# Patient Record
Sex: MALE | Race: WHITE | NOT HISPANIC OR LATINO | URBAN - METROPOLITAN AREA
[De-identification: names, ages, dates, MRNs, and addresses within clinical notes are randomized per-mention and may not be internally consistent; named-entity substitution may affect disease eponyms.]

---

## 2021-12-12 NOTE — PROGRESS NOTES
SUBJECTIVE:   CC: Henry Aguilar is an 21 year old male who presents for preventative health visit.     {  Patient has been advised of split billing requirements and indicates understanding: Yes  Healthy Habits:     Getting at least 3 servings of Calcium per day:  NO    Bi-annual eye exam:  Yes    Dental care twice a year:  Yes    Sleep apnea or symptoms of sleep apnea:  None    Diet:  Regular (no restrictions)    Frequency of exercise:  4-5 days/week    Duration of exercise:  Greater than 60 minutes    Taking medications regularly:  Yes    Medication side effects:  None    PHQ-2 Total Score: 0    Additional concerns today:  Yes      Today's PHQ-2 Score:   PHQ-2 ( 1999 Pfizer) 12/15/2021   Q1: Little interest or pleasure in doing things 0   Q2: Feeling down, depressed or hopeless 0   PHQ-2 Score 0   Q1: Little interest or pleasure in doing things Not at all   Q2: Feeling down, depressed or hopeless Not at all   PHQ-2 Score 0       Abuse: Current or Past(Physical, Sexual or Emotional)- No  Do you feel safe in your environment? Yes        Social History     Tobacco Use     Smoking status: Never Smoker     Smokeless tobacco: Never Used   Substance Use Topics     Alcohol use: Yes     Comment: occasional     If you drink alcohol do you typically have >3 drinks per day or >7 drinks per week? No    Alcohol Use 12/15/2021   Prescreen: >3 drinks/day or >7 drinks/week? No   Prescreen: >3 drinks/day or >7 drinks/week? -   No flowsheet data found.    Last PSA: No results found for: PSA    Reviewed orders with patient. Reviewed health maintenance and updated orders accordingly - Yes  Lab work is in process  Labs reviewed in EPIC  BP Readings from Last 3 Encounters:   12/15/21 133/78    Wt Readings from Last 3 Encounters:   12/15/21 57.7 kg (127 lb 1.6 oz)                    Reviewed and updated as needed this visit by clinical staff  Tobacco  Allergies  Meds  Problems  Med Hx  Surg Hx  Fam Hx  Soc Hx  "        Reviewed and updated as needed this visit by Provider    Meds  Problems  Med Hx  Surg Hx  Fam Hx        Here today to establish care.  As expected for his age the patient is generally in pretty decent health.  Saw dermatology recently for the assistance with some early hidradenitis.  Now on topical therapy.  Has experienced issues with anxiety since teenage years.  Over the past year things have flared up a bit more and he is experiencing some OCD type symptoms as well.  Is interested in meeting with a counselor to talk about how to help keep this in check.    Review of Systems   Gastrointestinal: Positive for heartburn.   Psychiatric/Behavioral: The patient is nervous/anxious.      CONSTITUTIONAL: NEGATIVE for fever, chills, change in weight  INTEGUMENTARY/SKIN: As above  EYES: NEGATIVE for vision changes or irritation  ENT: NEGATIVE for ear, mouth and throat problems  RESP: NEGATIVE for significant cough or SOB  CV: NEGATIVE for chest pain, palpitations or peripheral edema  GI: NEGATIVE for nausea, abdominal pain, heartburn, or change in bowel habits   male: negative for dysuria, hematuria, decreased urinary stream, erectile dysfunction, urethral discharge  MUSCULOSKELETAL: NEGATIVE for significant arthralgias or myalgia  NEURO: NEGATIVE for weakness, dizziness or paresthesias  PSYCHIATRIC: As above    OBJECTIVE:   /78 (BP Location: Right arm, Patient Position: Sitting, Cuff Size: Adult Regular)   Pulse 110   Temp 98.9  F (37.2  C) (Oral)   Resp 18   Ht 1.626 m (5' 4\")   Wt 57.7 kg (127 lb 1.6 oz)   SpO2 95%   BMI 21.82 kg/m      Physical Exam  GENERAL: healthy, alert and no distress  EYES: Eyes grossly normal to inspection, PERRL and conjunctivae and sclerae normal  HENT: ear canals and TM's normal, nose and mouth without ulcers or lesions  NECK: no adenopathy, no asymmetry, masses, or scars and thyroid normal to palpation  RESP: lungs clear to auscultation - no rales, rhonchi or " "wheezes  CV: regular rate and rhythm, normal S1 S2, no S3 or S4, no murmur, click or rub, no peripheral edema and peripheral pulses strong  ABDOMEN: soft, nontender, no hepatosplenomegaly, no masses and bowel sounds normal  MS: no gross musculoskeletal defects noted, no edema  SKIN: no suspicious lesions or rashes  NEURO: Normal strength and tone, mentation intact and speech normal  PSYCH: mentation appears normal, affect normal/bright    Diagnostic Test Results:  Labs reviewed in Epic    ASSESSMENT/PLAN:   (Z00.00) Routine general medical examination at a health care facility  (primary encounter diagnosis)  Comment: Routine health maintenance up-to-date otherwise.  Has had Covid booster and flu shot  Plan:     (F41.9) Anxiety  Comment: We will help get him set up with psychology/counseling for this  Plan: Adult Mental Health Referral            (L73.2) Hidradenitis  Comment: Working with dermatology we will follow along  Plan:     (Z13.220) Screening cholesterol level  Comment:   Plan: Lipid panel reflex to direct LDL Non-fasting            (Z13.1) Diabetes mellitus screening  Comment:   Plan: Glucose            (Z11.3) Screen for STD (sexually transmitted disease)  Comment:   Plan: HIV Antigen Antibody Combo, NEISSERIA         GONORRHOEA PCR, CHLAMYDIA TRACHOMATIS PCR              Patient has been advised of split billing requirements and indicates understanding: Yes  COUNSELING:   Reviewed preventive health counseling, as reflected in patient instructions       Regular exercise       Healthy diet/nutrition       Vision screening       Safe sex practices/STD prevention    Estimated body mass index is 21.82 kg/m  as calculated from the following:    Height as of this encounter: 1.626 m (5' 4\").    Weight as of this encounter: 57.7 kg (127 lb 1.6 oz).         He reports that he has never smoked. He has never used smokeless tobacco.      Counseling Resources:  ATP IV Guidelines  Pooled Cohorts Equation " Calculator  FRAX Risk Assessment  ICSI Preventive Guidelines  Dietary Guidelines for Americans, 2010  USDA's MyPlate  ASA Prophylaxis  Lung CA Screening    Susie Carvalho MD  [facility]

## 2021-12-15 ENCOUNTER — OFFICE VISIT (OUTPATIENT)
Dept: FAMILY MEDICINE | Facility: CLINIC | Age: 21
End: 2021-12-15
Payer: COMMERCIAL

## 2021-12-15 VITALS
DIASTOLIC BLOOD PRESSURE: 78 MMHG | TEMPERATURE: 98.9 F | OXYGEN SATURATION: 95 % | HEART RATE: 110 BPM | SYSTOLIC BLOOD PRESSURE: 133 MMHG | BODY MASS INDEX: 21.7 KG/M2 | RESPIRATION RATE: 18 BRPM | HEIGHT: 64 IN | WEIGHT: 127.1 LBS

## 2021-12-15 DIAGNOSIS — Z11.3 SCREEN FOR STD (SEXUALLY TRANSMITTED DISEASE): ICD-10-CM

## 2021-12-15 DIAGNOSIS — Z13.220 SCREENING CHOLESTEROL LEVEL: ICD-10-CM

## 2021-12-15 DIAGNOSIS — L73.2 HIDRADENITIS: ICD-10-CM

## 2021-12-15 DIAGNOSIS — F41.9 ANXIETY: ICD-10-CM

## 2021-12-15 DIAGNOSIS — Z13.1 DIABETES MELLITUS SCREENING: ICD-10-CM

## 2021-12-15 DIAGNOSIS — Z00.00 ROUTINE GENERAL MEDICAL EXAMINATION AT A HEALTH CARE FACILITY: Primary | ICD-10-CM

## 2021-12-15 PROCEDURE — 87591 N.GONORRHOEAE DNA AMP PROB: CPT | Performed by: FAMILY MEDICINE

## 2021-12-15 PROCEDURE — 87491 CHLMYD TRACH DNA AMP PROBE: CPT | Performed by: FAMILY MEDICINE

## 2021-12-15 PROCEDURE — 80061 LIPID PANEL: CPT | Performed by: FAMILY MEDICINE

## 2021-12-15 PROCEDURE — 36415 COLL VENOUS BLD VENIPUNCTURE: CPT | Performed by: FAMILY MEDICINE

## 2021-12-15 PROCEDURE — 87389 HIV-1 AG W/HIV-1&-2 AB AG IA: CPT | Performed by: FAMILY MEDICINE

## 2021-12-15 PROCEDURE — 82947 ASSAY GLUCOSE BLOOD QUANT: CPT | Performed by: FAMILY MEDICINE

## 2021-12-15 PROCEDURE — 99385 PREV VISIT NEW AGE 18-39: CPT | Performed by: FAMILY MEDICINE

## 2021-12-15 RX ORDER — CLINDAMYCIN PHOSPHATE 10 MG/G
GEL TOPICAL
COMMUNITY
Start: 2021-12-10 | End: 2023-01-31

## 2021-12-15 RX ORDER — TRETINOIN 0.25 MG/G
CREAM TOPICAL
COMMUNITY
Start: 2021-12-10 | End: 2023-01-31

## 2021-12-15 ASSESSMENT — ENCOUNTER SYMPTOMS
HEARTBURN: 1
NERVOUS/ANXIOUS: 1

## 2021-12-15 ASSESSMENT — PAIN SCALES - GENERAL: PAINLEVEL: MILD PAIN (3)

## 2021-12-15 ASSESSMENT — MIFFLIN-ST. JEOR: SCORE: 1492.52

## 2021-12-16 LAB
CHOLEST SERPL-MCNC: 171 MG/DL
FASTING STATUS PATIENT QL REPORTED: NO
FASTING STATUS PATIENT QL REPORTED: NO
GLUCOSE BLD-MCNC: 79 MG/DL (ref 70–99)
HDLC SERPL-MCNC: 70 MG/DL
LDLC SERPL CALC-MCNC: 78 MG/DL
NONHDLC SERPL-MCNC: 101 MG/DL
TRIGL SERPL-MCNC: 115 MG/DL

## 2021-12-17 LAB
C TRACH DNA SPEC QL NAA+PROBE: NEGATIVE
HIV 1+2 AB+HIV1 P24 AG SERPL QL IA: NONREACTIVE
N GONORRHOEA DNA SPEC QL NAA+PROBE: NEGATIVE

## 2021-12-17 NOTE — RESULT ENCOUNTER NOTE
Henry,  Everything looks just fine.  Keep up the good work.  Nice to meet you the other day.  JERICHO Carvalho M.D.

## 2022-10-03 ENCOUNTER — HEALTH MAINTENANCE LETTER (OUTPATIENT)
Age: 22
End: 2022-10-03

## 2023-01-31 ENCOUNTER — OFFICE VISIT (OUTPATIENT)
Dept: FAMILY MEDICINE | Facility: CLINIC | Age: 23
End: 2023-01-31
Payer: COMMERCIAL

## 2023-01-31 VITALS
SYSTOLIC BLOOD PRESSURE: 120 MMHG | RESPIRATION RATE: 20 BRPM | HEART RATE: 129 BPM | OXYGEN SATURATION: 100 % | WEIGHT: 127.2 LBS | BODY MASS INDEX: 21.72 KG/M2 | TEMPERATURE: 99 F | DIASTOLIC BLOOD PRESSURE: 76 MMHG | HEIGHT: 64 IN

## 2023-01-31 DIAGNOSIS — M25.532 PAIN IN BOTH WRISTS: ICD-10-CM

## 2023-01-31 DIAGNOSIS — M25.531 PAIN IN BOTH WRISTS: ICD-10-CM

## 2023-01-31 DIAGNOSIS — M65.30 TRIGGER FINGER, ACQUIRED: ICD-10-CM

## 2023-01-31 DIAGNOSIS — Z00.00 ROUTINE GENERAL MEDICAL EXAMINATION AT A HEALTH CARE FACILITY: Primary | ICD-10-CM

## 2023-01-31 PROCEDURE — 99395 PREV VISIT EST AGE 18-39: CPT | Performed by: FAMILY MEDICINE

## 2023-01-31 ASSESSMENT — ENCOUNTER SYMPTOMS
NERVOUS/ANXIOUS: 0
DIARRHEA: 0
NAUSEA: 0
JOINT SWELLING: 0
HEARTBURN: 0
FEVER: 0
SHORTNESS OF BREATH: 0
HEMATOCHEZIA: 0
EYE PAIN: 0
DYSURIA: 0
HEMATURIA: 0
ARTHRALGIAS: 0
CONSTIPATION: 0
ABDOMINAL PAIN: 0
HEADACHES: 0
PARESTHESIAS: 0
COUGH: 0
DIZZINESS: 0
FREQUENCY: 0
CHILLS: 0
PALPITATIONS: 0
MYALGIAS: 0
WEAKNESS: 0
SORE THROAT: 0

## 2023-01-31 NOTE — PROGRESS NOTES
SUBJECTIVE:   CC: Henry is an 22 year old who presents for preventative health visit.     Patient has been advised of split billing requirements and indicates understanding: Yes     Healthy Habits:     Getting at least 3 servings of Calcium per day:  Yes    Bi-annual eye exam:  Yes    Dental care twice a year:  Yes    Sleep apnea or symptoms of sleep apnea:  None    Diet:  Regular (no restrictions)    Frequency of exercise:  4-5 days/week    Duration of exercise:  45-60 minutes    Taking medications regularly:  Yes    Medication side effects:  Not applicable    PHQ-2 Total Score: 0    Additional concerns today:  Yes    Patient has concerns about his bilateral wrist pain. He states he's been experiencing it for a year but for the past 6 months, it seems to have increase in severity. Patient states the pain gets worse with lifting.       Today's PHQ-2 Score:   PHQ-2 ( 1999 Pfizer) 1/31/2023   Q1: Little interest or pleasure in doing things 0   Q2: Feeling down, depressed or hopeless 0   PHQ-2 Score 0   Q1: Little interest or pleasure in doing things Not at all   Q2: Feeling down, depressed or hopeless Not at all   PHQ-2 Score 0       Have you ever done Advance Care Planning? (For example, a Health Directive, POLST, or a discussion with a medical provider or your loved ones about your wishes):     Social History     Tobacco Use     Smoking status: Never     Smokeless tobacco: Never   Substance Use Topics     Alcohol use: Yes     Comment: occasional     If you drink alcohol do you typically have >3 drinks per day or >7 drinks per week? No    Alcohol Use 1/31/2023   Prescreen: >3 drinks/day or >7 drinks/week? No   Prescreen: >3 drinks/day or >7 drinks/week? -   No flowsheet data found.    Last PSA: No results found for: PSA    Reviewed orders with patient. Reviewed health maintenance and updated orders accordingly - Yes  Lab work is in process  Labs reviewed in EPIC  BP Readings from Last 3 Encounters:   01/31/23 120/76  "  12/15/21 133/78    Wt Readings from Last 3 Encounters:   01/31/23 57.7 kg (127 lb 3.2 oz)   12/15/21 57.7 kg (127 lb 1.6 oz)                    Reviewed and updated as needed this visit by clinical staff   Tobacco  Allergies  Meds  Problems  Med Hx  Surg Hx  Fam Hx          Reviewed and updated as needed this visit by Provider   Tobacco  Allergies  Meds  Problems  Med Hx  Surg Hx  Fam Hx         Here today for routine checkup.  For the most part doing very well overall.  Working as described at [employer].  Lifts weights at the gym routinely but for the past number of months has developed some bilateral wrist pain.  Also has his index fingers lock in a trigger fashion.  Occasionally using the squat bar across his neck his arms will go slightly numb.    Review of Systems   Constitutional: Negative for chills and fever.   HENT: Negative for congestion, ear pain, hearing loss and sore throat.    Eyes: Negative for pain and visual disturbance.   Respiratory: Negative for cough and shortness of breath.    Cardiovascular: Negative for chest pain, palpitations and peripheral edema.   Gastrointestinal: Negative for abdominal pain, constipation, diarrhea, heartburn, hematochezia and nausea.   Genitourinary: Negative for dysuria, frequency, genital sores, hematuria, impotence, penile discharge and urgency.   Musculoskeletal: Negative for arthralgias, joint swelling and myalgias.   Skin: Negative for rash.   Neurological: Negative for dizziness, weakness, headaches and paresthesias.   Psychiatric/Behavioral: Negative for mood changes. The patient is not nervous/anxious.           OBJECTIVE:   /76 (BP Location: Right arm, Patient Position: Chair, Cuff Size: Adult Regular)   Pulse (!) 129   Temp 99  F (37.2  C) (Oral)   Resp 20   Ht 1.626 m (5' 4\")   Wt 57.7 kg (127 lb 3.2 oz)   SpO2 100%   BMI 21.83 kg/m      Physical Exam  GENERAL: healthy, alert and no distress  EYES: Eyes grossly normal to " inspection, PERRL and conjunctivae and sclerae normal  HENT: ear canals and TM's normal, nose and mouth without ulcers or lesions  NECK: no adenopathy, no asymmetry, masses, or scars and thyroid normal to palpation  RESP: lungs clear to auscultation - no rales, rhonchi or wheezes  CV: regular rate and rhythm, normal S1 S2, no S3 or S4, no murmur, click or rub, no peripheral edema and peripheral pulses strong  ABDOMEN: soft, nontender, no hepatosplenomegaly, no masses and bowel sounds normal  MS: no gross musculoskeletal defects noted, no edema  SKIN: no suspicious lesions or rashes  NEURO: Normal strength and tone, mentation intact and speech normal  PSYCH: mentation appears normal, affect normal/bright    Diagnostic Test Results:  Labs reviewed in Epic    ASSESSMENT/PLAN:   (Z00.00) Routine general medical examination at a health care facility  (primary encounter diagnosis)  Comment: Routine health maintenance otherwise up-to-date.  We had a discussion about potential pre-HIV prophylaxis (Kiya Ramsay).  So we talked about how that worked and he will contact me if that is something he is interested in starting.  Would recommend HIV (and may be other STD) testing at baseline and 3 months.  Plan:     (M25.531,  M25.532) Pain in both wrists  Comment: I think he be a great candidate to see sports medicine as we want to keep him going is much as possible.  Plan: Orthopedic  Referral            (M65.30) Trigger finger, acquired  Comment:   Plan: Orthopedic  Referral              Patient has been advised of split billing requirements and indicates understanding: Yes      COUNSELING:   Reviewed preventive health counseling, as reflected in patient instructions       Regular exercise       Healthy diet/nutrition       Vision screening        He reports that he has never smoked. He has never used smokeless tobacco.            Susie Carvalho MD  [facility]

## 2023-02-03 NOTE — TELEPHONE ENCOUNTER
DIAGNOSIS: Pain in both wrists, trigger finger    APPOINTMENT DATE: 02/22/2023   NOTES STATUS DETAILS   OFFICE NOTE from referring provider Internal 01/31/2023 Dr Carvalho [facility]    OFFICE NOTE from other specialist N/A    DISCHARGE SUMMARY from hospital N/A    DISCHARGE REPORT from the ER N/A    OPERATIVE REPORT N/A    EMG report N/A    MEDICATION LIST N/A    MRI N/A    DEXA (osteoporosis/bone health) N/A    CT SCAN N/A    XRAYS (IMAGES & REPORTS) N/A

## 2023-02-13 ENCOUNTER — LAB (OUTPATIENT)
Dept: LAB | Facility: CLINIC | Age: 23
End: 2023-02-13
Payer: COMMERCIAL

## 2023-02-13 DIAGNOSIS — Z29.81 ENCOUNTER FOR COUNSELING BEFORE STARTING AND ABOUT PRE-EXPOSURE PROPHYLAXIS FOR HIV: ICD-10-CM

## 2023-02-13 DIAGNOSIS — Z71.89 ENCOUNTER FOR COUNSELING BEFORE STARTING AND ABOUT PRE-EXPOSURE PROPHYLAXIS FOR HIV: ICD-10-CM

## 2023-02-13 DIAGNOSIS — Z11.3 SCREEN FOR STD (SEXUALLY TRANSMITTED DISEASE): ICD-10-CM

## 2023-02-13 PROCEDURE — 86803 HEPATITIS C AB TEST: CPT

## 2023-02-13 PROCEDURE — 87389 HIV-1 AG W/HIV-1&-2 AB AG IA: CPT

## 2023-02-13 PROCEDURE — 36415 COLL VENOUS BLD VENIPUNCTURE: CPT

## 2023-02-14 LAB
HCV AB SERPL QL IA: NONREACTIVE
HIV 1+2 AB+HIV1 P24 AG SERPL QL IA: NONREACTIVE

## 2023-02-22 ENCOUNTER — ANCILLARY PROCEDURE (OUTPATIENT)
Dept: GENERAL RADIOLOGY | Facility: CLINIC | Age: 23
End: 2023-02-22
Attending: PREVENTIVE MEDICINE
Payer: COMMERCIAL

## 2023-02-22 ENCOUNTER — PRE VISIT (OUTPATIENT)
Dept: ORTHOPEDICS | Facility: CLINIC | Age: 23
End: 2023-02-22

## 2023-02-22 ENCOUNTER — OFFICE VISIT (OUTPATIENT)
Dept: ORTHOPEDICS | Facility: CLINIC | Age: 23
End: 2023-02-22
Attending: FAMILY MEDICINE
Payer: COMMERCIAL

## 2023-02-22 DIAGNOSIS — M54.2 NECK PAIN: Primary | ICD-10-CM

## 2023-02-22 DIAGNOSIS — M25.532 PAIN IN BOTH WRISTS: ICD-10-CM

## 2023-02-22 DIAGNOSIS — M54.12 CERVICAL RADICULAR PAIN: ICD-10-CM

## 2023-02-22 DIAGNOSIS — M65.30 TRIGGER FINGER, ACQUIRED: ICD-10-CM

## 2023-02-22 DIAGNOSIS — M25.531 PAIN IN BOTH WRISTS: ICD-10-CM

## 2023-02-22 DIAGNOSIS — M54.2 NECK PAIN: ICD-10-CM

## 2023-02-22 PROCEDURE — 99204 OFFICE O/P NEW MOD 45 MIN: CPT | Performed by: PREVENTIVE MEDICINE

## 2023-02-22 PROCEDURE — 73130 X-RAY EXAM OF HAND: CPT | Mod: LT | Performed by: RADIOLOGY

## 2023-02-22 PROCEDURE — 72040 X-RAY EXAM NECK SPINE 2-3 VW: CPT | Mod: GC | Performed by: RADIOLOGY

## 2023-02-22 RX ORDER — NAPROXEN 500 MG/1
500 TABLET ORAL 2 TIMES DAILY WITH MEALS
Qty: 40 TABLET | Refills: 0 | Status: SHIPPED | OUTPATIENT
Start: 2023-02-22 | End: 2023-09-05

## 2023-02-22 NOTE — PROGRESS NOTES
HISTORY OF PRESENT ILLNESS  Mr. Aguilar is a pleasant 22 year old year old male who presents to clinic today with   Henry explains that he is here today to discuss bilateral trigger finger, as well as bilateral tingling in arms. These symptoms have been present roughly 6 months     Location: bilateral wrist pain, bilateral trigger finger     Quality:  achy pain    Severity: 5/10 at worst    Duration: see above  Timing: occurs intermittently  Context: occurs while exercising and lifting and using the joint  Modifying factors:  resting and non-use makes it better, movement and use makes it worse  Associated signs & symptoms: see above  MEDICAL HISTORY  Patient Active Problem List   Diagnosis     Hidradenitis     Anxiety       Current Outpatient Medications   Medication Sig Dispense Refill     emtricitabine-tenofovir (TRUVADA) 200-300 MG per tablet 2 tabs prior to sexual activity, then 1 tab daily x 2 days. 20 tablet 2       Allergies   Allergen Reactions     Sulfa Drugs        Family History   Problem Relation Age of Onset     Hypertension Mother      Social History     Socioeconomic History     Marital status: Single   Tobacco Use     Smoking status: Never     Smokeless tobacco: Never   Substance and Sexual Activity     Alcohol use: Yes     Comment: occasional     Drug use: Never     Sexual activity: Yes     Partners: Male     Birth control/protection: None       Additional medical/Social/Surgical histories reviewed in [place] and updated as appropriate.     REVIEW OF SYSTEMS (2/22/2023)  10 point ROS of systems including Constitutional, Eyes, Respiratory, Cardiovascular, Gastroenterology, Genitourinary, Integumentary, Musculoskeletal, Psychiatric, Allergic/Immunologic were all negative except for pertinent positives noted in my HPI.     PHYSICAL EXAM  VSS    General  - normal appearance, in no obvious distress  HEENT  - conjunctivae not injected, moist mucous membranes, normocephalic/atraumatic head, ears normal  appearance, no lesions, mouth normal appearance, no scars, normal dentition and teeth present  CV  - normal peripheral perfusion  Pulm  - normal respiratory pattern, non-labored    Musculoskeletal - CERVICAL SPINE  - stance and posture: normal gait without limp, no obvious leg length discrepancy, normal heel and toe walk, normal balance, slightly forward shoulders, head balanced normally on trunk  - inspection: normal bone and joint alignment, no obvious kyphosis  - palpation: no paravertebral or bony tenderness, except at base of neck and trapezius muscles  - ROM: normal and painless flexion, extension, left and right sidebending and rotation with some discomfort  - strength: upper extremities 5/5 in all planes  - special tests:  (+) spurlings    Neuro  - biceps, triceps, supinator DTRs 2+ bilaterally, no sensory or motor deficit, grossly normal coordination, normal muscle tone  Skin  - no ecchymosis, erythema, warmth, or induration, no obvious rash  Psych  - interactive, appropriate, normal mood and affect  Hands; normal ROm, no pain on exam today  ASSESSMENT & PLAN  21 yo male with cervical ddd, disc herniations, radicular pain, trigger finger    I independently reviewed the following imaging studies:  Cervical xrays: shows some ddd  xrays hands: no significant findings, normal  Given HEP   RX given for naproxen PRN  Start PT  F/u 1-2 months      Appropriate PPE was utilized for prevention of spread of Covid-19.  Otis Cortes MD, CAM

## 2023-02-22 NOTE — LETTER
2/22/2023         RE: Henry Aguilar  [address]  [place] [address]        Dear Colleague,    Thank you for referring your patient, Henry Aguilar, to the [facility] [place]. Please see a copy of my visit note below.    HISTORY OF PRESENT ILLNESS  Mr. Aguilar is a pleasant 22 year old year old male who presents to clinic today with   Henry explains that he is here today to discuss bilateral trigger finger, as well as bilateral tingling in arms. These symptoms have been present roughly 6 months     Location: bilateral wrist pain, bilateral trigger finger     Quality:  achy pain    Severity: 5/10 at worst    Duration: see above  Timing: occurs intermittently  Context: occurs while exercising and lifting and using the joint  Modifying factors:  resting and non-use makes it better, movement and use makes it worse  Associated signs & symptoms: see above  MEDICAL HISTORY  Patient Active Problem List   Diagnosis     Hidradenitis     Anxiety       Current Outpatient Medications   Medication Sig Dispense Refill     emtricitabine-tenofovir (TRUVADA) 200-300 MG per tablet 2 tabs prior to sexual activity, then 1 tab daily x 2 days. 20 tablet 2       Allergies   Allergen Reactions     Sulfa Drugs        Family History   Problem Relation Age of Onset     Hypertension Mother      Social History     Socioeconomic History     Marital status: Single   Tobacco Use     Smoking status: Never     Smokeless tobacco: Never   Substance and Sexual Activity     Alcohol use: Yes     Comment: occasional     Drug use: Never     Sexual activity: Yes     Partners: Male     Birth control/protection: None       Additional medical/Social/Surgical histories reviewed in [facility] and updated as appropriate.     REVIEW OF SYSTEMS (2/22/2023)  10 point ROS of systems including Constitutional, Eyes, Respiratory, Cardiovascular, Gastroenterology, Genitourinary, Integumentary, Musculoskeletal, Psychiatric,  Allergic/Immunologic were all negative except for pertinent positives noted in my HPI.     PHYSICAL EXAM  VSS    General  - normal appearance, in no obvious distress  HEENT  - conjunctivae not injected, moist mucous membranes, normocephalic/atraumatic head, ears normal appearance, no lesions, mouth normal appearance, no scars, normal dentition and teeth present  CV  - normal peripheral perfusion  Pulm  - normal respiratory pattern, non-labored    Musculoskeletal - CERVICAL SPINE  - stance and posture: normal gait without limp, no obvious leg length discrepancy, normal heel and toe walk, normal balance, slightly forward shoulders, head balanced normally on trunk  - inspection: normal bone and joint alignment, no obvious kyphosis  - palpation: no paravertebral or bony tenderness, except at base of neck and trapezius muscles  - ROM: normal and painless flexion, extension, left and right sidebending and rotation with some discomfort  - strength: upper extremities 5/5 in all planes  - special tests:  (+) spurlings    Neuro  - biceps, triceps, supinator DTRs 2+ bilaterally, no sensory or motor deficit, grossly normal coordination, normal muscle tone  Skin  - no ecchymosis, erythema, warmth, or induration, no obvious rash  Psych  - interactive, appropriate, normal mood and affect  Hands; normal ROm, no pain on exam today  ASSESSMENT & PLAN  21 yo male with cervical ddd, disc herniations, radicular pain, trigger finger    I independently reviewed the following imaging studies:  Cervical xrays: shows some ddd  xrays hands: no significant findings, normal  Given HEP   RX given for naproxen PRN  Start PT  F/u 1-2 months      Appropriate PPE was utilized for prevention of spread of Covid-19.  Otis Cortes MD, [facility]          Again, thank you for allowing me to participate in the care of your patient.        Sincerely,        Otis Cortes MD

## 2023-02-24 ENCOUNTER — THERAPY VISIT (OUTPATIENT)
Dept: PHYSICAL THERAPY | Facility: CLINIC | Age: 23
End: 2023-02-24
Payer: COMMERCIAL

## 2023-02-24 DIAGNOSIS — M65.30 TRIGGER FINGER, ACQUIRED: ICD-10-CM

## 2023-02-24 DIAGNOSIS — M54.12 CERVICAL RADICULOPATHY: ICD-10-CM

## 2023-02-24 DIAGNOSIS — M54.12 CERVICAL RADICULAR PAIN: ICD-10-CM

## 2023-02-24 DIAGNOSIS — M25.531 PAIN IN BOTH WRISTS: Primary | ICD-10-CM

## 2023-02-24 DIAGNOSIS — M25.532 PAIN IN BOTH WRISTS: Primary | ICD-10-CM

## 2023-02-24 DIAGNOSIS — M54.2 NECK PAIN: ICD-10-CM

## 2023-02-24 PROCEDURE — 97110 THERAPEUTIC EXERCISES: CPT | Mod: GP | Performed by: PHYSICAL THERAPIST

## 2023-02-24 PROCEDURE — 97161 PT EVAL LOW COMPLEX 20 MIN: CPT | Mod: GP | Performed by: PHYSICAL THERAPIST

## 2023-02-24 PROCEDURE — 97530 THERAPEUTIC ACTIVITIES: CPT | Mod: GP | Performed by: PHYSICAL THERAPIST
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
## 2023-02-28 ENCOUNTER — THERAPY VISIT (OUTPATIENT)
Dept: PHYSICAL THERAPY | Facility: CLINIC | Age: 23
End: 2023-02-28
Payer: COMMERCIAL

## 2023-02-28 DIAGNOSIS — M25.532 PAIN IN BOTH WRISTS: ICD-10-CM

## 2023-02-28 DIAGNOSIS — M54.12 CERVICAL RADICULOPATHY: Primary | ICD-10-CM

## 2023-02-28 DIAGNOSIS — M25.531 PAIN IN BOTH WRISTS: ICD-10-CM

## 2023-02-28 PROCEDURE — 97530 THERAPEUTIC ACTIVITIES: CPT | Mod: GP | Performed by: PHYSICAL THERAPY ASSISTANT

## 2023-02-28 PROCEDURE — 97110 THERAPEUTIC EXERCISES: CPT | Mod: GP | Performed by: PHYSICAL THERAPY ASSISTANT

## 2023-03-07 ENCOUNTER — VIRTUAL VISIT (OUTPATIENT)
Dept: ORTHOPEDICS | Facility: CLINIC | Age: 23
End: 2023-03-07
Payer: COMMERCIAL

## 2023-03-07 DIAGNOSIS — M25.532 PAIN IN BOTH WRISTS: ICD-10-CM

## 2023-03-07 DIAGNOSIS — M25.531 PAIN IN BOTH WRISTS: ICD-10-CM

## 2023-03-07 DIAGNOSIS — M54.12 CERVICAL RADICULOPATHY: Primary | ICD-10-CM

## 2023-03-07 PROCEDURE — 99213 OFFICE O/P EST LOW 20 MIN: CPT | Mod: 95 | Performed by: PREVENTIVE MEDICINE

## 2023-03-07 NOTE — LETTER
3/7/2023         RE: Henry Aguilar  [address]  [facility] [address]        Dear Colleague,    Thank you for referring your patient, Henry Aguilar, to the [facility] [place]. Please see a copy of my visit note below.    Patient is a  22   year old who is being evaluated via a billable telephone visit.      What phone number would you like to be contacted at? CELL  How would you like to obtain your AVS? MYCHART        Subjective   Patient is a   22  year old who presents by phone call visit for the following:     HPI   Has started PT and continues to have improvement in symptoms of cervical radiculopathy and bilateral wrist pain  Has not used naproxen yet    Review of Systems   Constitutional, HEENT, cardiovascular, pulmonary, gi and gu systems are negative, except as otherwise noted.      Objective           Vitals:  No vitals were obtained today due to virtual visit.    Physical Exam   healthy, alert and no distress  PSYCH: Alert and oriented times 3; coherent speech, normal   rate and volume, able to articulate logical thoughts, able   to abstract reason, no tangential thoughts, no hallucinations   or delusions  His affect is normal  RESP: No cough, no audible wheezing, able to talk in full sentences  Remainder of exam unable to be completed due to telephone visits    Assessment/Plan  23 yo male with cervical radiucular pain, bilateral wrist pain, improving    I independently reviewed the following imaging studies and discussed with patient:  Cervical xrays: overall normal  Discussed continuing PT  Naproxen PRN- hasn't been using  F/u 1-2 months PRN          Phone call duration: 20 minutes  Phone call start: 850am  Phone call end: 910am  Dr Cortes      Again, thank you for allowing me to participate in the care of your patient.        Sincerely,        Otis Cortes MD

## 2023-03-07 NOTE — LETTER
3/7/2023         RE: Henry Aguilar  [address]  [place] [facility] [address]        Dear Colleague,    Thank you for referring your patient, Henry Aguilar, to the [facility] [place]. Please see a copy of my visit note below.    Patient is a  22   year old who is being evaluated via a billable telephone visit.      What phone number would you like to be contacted at? CELL  How would you like to obtain your AVS? MYCHART        Subjective   Patient is a   22  year old who presents by phone call visit for the following:     HPI   Has started PT and continues to have improvement in symptoms of cervical radiculopathy and bilateral wrist pain  Has not used naproxen yet    Review of Systems   Constitutional, HEENT, cardiovascular, pulmonary, gi and gu systems are negative, except as otherwise noted.      Objective           Vitals:  No vitals were obtained today due to virtual visit.    Physical Exam   healthy, alert and no distress  PSYCH: Alert and oriented times 3; coherent speech, normal   rate and volume, able to articulate logical thoughts, able   to abstract reason, no tangential thoughts, no hallucinations   or delusions  His affect is normal  RESP: No cough, no audible wheezing, able to talk in full sentences  Remainder of exam unable to be completed due to telephone visits    Assessment/Plan  23 yo male with cervical radiucular pain, bilateral wrist pain, improving    I independently reviewed the following imaging studies and discussed with patient:  Cervical xrays: overall normal  Discussed continuing PT  Naproxen PRN- hasn't been using  F/u 1-2 months PRN          Phone call duration: 20 minutes  Phone call start: 850am  Phone call end: 910am  Dr Cortes      Again, thank you for allowing me to participate in the care of your patient.        Sincerely,        Otis Cortes MD

## 2023-03-07 NOTE — PROGRESS NOTES
Patient is a  22   year old who is being evaluated via a billable telephone visit.      What phone number would you like to be contacted at? CELL  How would you like to obtain your AVS? STEPHY        Subjective   Patient is a   22  year old who presents by phone call visit for the following:     HPI   Has started PT and continues to have improvement in symptoms of cervical radiculopathy and bilateral wrist pain  Has not used naproxen yet    Review of Systems   Constitutional, HEENT, cardiovascular, pulmonary, gi and gu systems are negative, except as otherwise noted.      Objective           Vitals:  No vitals were obtained today due to virtual visit.    Physical Exam   healthy, alert and no distress  PSYCH: Alert and oriented times 3; coherent speech, normal   rate and volume, able to articulate logical thoughts, able   to abstract reason, no tangential thoughts, no hallucinations   or delusions  His affect is normal  RESP: No cough, no audible wheezing, able to talk in full sentences  Remainder of exam unable to be completed due to telephone visits    Assessment/Plan  21 yo male with cervical radiucular pain, bilateral wrist pain, improving    I independently reviewed the following imaging studies and discussed with patient:  Cervical xrays: overall normal  Discussed continuing PT  Naproxen PRN- hasn't been using  F/u 1-2 months PRN          Phone call duration: 20 minutes  Phone call start: 850am  Phone call end: 910am  Dr Cortes

## 2023-04-13 PROBLEM — M54.12 CERVICAL RADICULOPATHY: Status: RESOLVED | Noted: 2023-02-24 | Resolved: 2023-04-13

## 2023-04-13 PROBLEM — M25.531 PAIN IN BOTH WRISTS: Status: RESOLVED | Noted: 2023-02-24 | Resolved: 2023-04-13

## 2023-04-13 PROBLEM — M25.532 PAIN IN BOTH WRISTS: Status: RESOLVED | Noted: 2023-02-24 | Resolved: 2023-04-13

## 2023-04-13 NOTE — PROGRESS NOTES
Discharge Note    Progress reporting period is from initial evaluation date (please see noted date below) to Feb 28, 2023.  Linked Episodes   Type: Episode: Status: Noted: Resolved: Last update: Updated by:   PHYSICAL THERAPY cerrvical readiculopathy,B wrist pn,trigger finger 2/24/2023 Active 2/24/2023 2/28/2023  2:49 PM Cece Hanson, PT      Comments:       Henry failed to follow up and current status is unknown.  Please see information below for last relevant information on current status.  Patient seen for 2 visits.    SUBJECTIVE  Subjective changes noted by patient:  Pt reports no changes yet. HEP went well, no issues. Wrists/hands feel the same.  .  Current pain level is 0/10.     Previous pain level was   (moderate level of symptoms).   Changes in function:  Yes (See Goal flowsheet attached for changes in current functional level)  Adverse reaction to treatment or activity: None    OBJECTIVE  Changes noted in objective findings: CROM Flex no loss; Ext no loss; L rot nil loss; R rot nil loss; SB symmetrical; MMT wrist ext mild weakness; Symmetrical ULTT; R hand test for trigger finger symptoms with wrist flexion/digit flexion-positive   Pt did follow up with MD on 3/7/2023 and related that symptoms are improving. Continued PT was recommended at that time.    ASSESSMENT/PLAN  Diagnosis: cx radicular B wrist pain, trigger finger   Updated problem list and treatment plan:   Pain - HEP  Decreased ROM/flexibility - HEP  Decreased function - HEP  STG/LTGs have been met or progress has been made towards goals:  Yes, please see goal flowsheet for most current information  Assessment of Progress: current status is unknown.    Last current status:     Self Management Plans:  HEP  I have re-evaluated this patient and find that the nature, scope, duration and intensity of the therapy is appropriate for the medical condition of the patient.  Henry continues to require the following intervention to meet STG and  LTG's:  HEP.    Recommendations:  Discharge with current home program.  Patient to follow up with MD as needed.    Please refer to the daily flowsheet for treatment today, total treatment time and time spent performing 1:1 timed codes.

## 2023-09-05 ENCOUNTER — VIRTUAL VISIT (OUTPATIENT)
Dept: FAMILY MEDICINE | Facility: CLINIC | Age: 23
End: 2023-09-05
Payer: COMMERCIAL

## 2023-09-05 DIAGNOSIS — Z79.899 ON PRE-EXPOSURE PROPHYLAXIS FOR HIV: ICD-10-CM

## 2023-09-05 DIAGNOSIS — Z20.1 EXPOSURE TO TB: ICD-10-CM

## 2023-09-05 DIAGNOSIS — Z11.3 SCREEN FOR STD (SEXUALLY TRANSMITTED DISEASE): Primary | ICD-10-CM

## 2023-09-05 PROCEDURE — 99214 OFFICE O/P EST MOD 30 MIN: CPT | Mod: VID | Performed by: FAMILY MEDICINE

## 2023-09-05 NOTE — PROGRESS NOTES
Henry is a 23 year old who is being evaluated via a billable video visit.      How would you like to obtain your AVS? MyChart  If the video visit is dropped, the invitation should be resent by: Text to cell phone: [phone]  Will anyone else be joining your video visit? No          Assessment & Plan     Screen for STD (sexually transmitted disease)  He will schedule lab only visit for labs  - Comprehensive metabolic panel (BMP + Alb, Alk Phos, ALT, AST, Total. Bili, TP); Future  - HIV Antigen Antibody Combo; Future  - Hepatitis C Screen Reflex to HCV RNA Quant and Genotype; Future  - NEISSERIA GONORRHOEA PCR; Future  - CHLAMYDIA TRACHOMATIS PCR; Future  - Treponema Abs w Reflex to RPR and Titer; Future    On pre-exposure prophylaxis for HIV  Using truvada prn high risk exposure. Reviewed importance regular screening. Rec visit 3 mo and prn for follow up. He should still have refills on file for truvada (only used up first rx so far)  - Comprehensive metabolic panel (BMP + Alb, Alk Phos, ALT, AST, Total. Bili, TP); Future  - HIV Antigen Antibody Combo; Future  - Hepatitis C Screen Reflex to HCV RNA Quant and Genotype; Future    Exposure to TB  Through work. He has had no sx's. We discussed testing is warranted but he elevated to follow up with employee health for this.            Stephanie Pelayo MD  [facility]   Henry is a 23 year old, presenting for the following health issues:  STD (Routine Screening )      9/5/2023     6:39 AM   Additional Questions   Roomed by Amber HALL   Accompanied by Self         9/5/2023     6:39 AM   Patient Reported Additional Medications   Patient reports taking the following new medications None     History of Present Illness       Reason for visit:  STD Routine    He eats 0-1 servings of fruits and vegetables daily.He consumes 0 sweetened beverage(s) daily.He exercises with enough effort to increase his heart rate 30 to 60 minutes per  day.  He exercises with enough effort to increase his heart rate 5 days per week.   He is taking medications regularly.       Concern - STD Check   Onset: Routine   Description: No Symptoms  Intensity: Preventative   Progression of Symptoms:  Preventative   Accompanying Signs & Symptoms: No Symptoms   Previous history of similar problem: No Symptoms   Precipitating factors:        Worsened by:No Symptoms   Alleviating factors:        Improved by: No Symptoms   Therapies tried and outcome: None      Only take truvada after high risk encounters. Uses condoms inconsistently  Occasional condomless intercourse  No known sx's. Denies penile discharge, dysuria, urinary frequency, fever, chills, malaise, pelvic pain     Work in health care  Last week got notifications that he was exposed to someone with TB in June.   No sx's- will get testing through work.   Denies fever, night sweats, cough                    Objective           Vitals:  No vitals were obtained today due to virtual visit.    Physical Exam   GENERAL: Healthy, alert and no distress  EYES: Eyes grossly normal to inspection.  No discharge or erythema, or obvious scleral/conjunctival abnormalities.  RESP: No audible wheeze, cough, or visible cyanosis.  No visible retractions or increased work of breathing.    SKIN: Visible skin clear. No significant rash, abnormal pigmentation or lesions.  NEURO: Cranial nerves grossly intact.  Mentation and speech appropriate for age.  PSYCH: Mentation appears normal, affect normal/bright, judgement and insight intact, normal speech and appearance well-groomed.              Video-Visit Details    Type of service:  Video Visit     Originating Location (pt. Location): Home    Distant Location (provider location):  Off-site  Platform used for Video Visit: ZenSuite

## 2023-09-06 ENCOUNTER — LAB (OUTPATIENT)
Dept: LAB | Facility: CLINIC | Age: 23
End: 2023-09-06
Payer: COMMERCIAL

## 2023-09-06 DIAGNOSIS — Z11.3 SCREEN FOR STD (SEXUALLY TRANSMITTED DISEASE): ICD-10-CM

## 2023-09-06 DIAGNOSIS — Z79.899 ON PRE-EXPOSURE PROPHYLAXIS FOR HIV: ICD-10-CM

## 2023-09-06 LAB
ALBUMIN SERPL BCG-MCNC: 4 G/DL (ref 3.5–5.2)
ALP SERPL-CCNC: 60 U/L (ref 40–129)
ALT SERPL W P-5'-P-CCNC: 22 U/L (ref 0–70)
ANION GAP SERPL CALCULATED.3IONS-SCNC: 8 MMOL/L (ref 7–15)
AST SERPL W P-5'-P-CCNC: 23 U/L (ref 0–45)
BILIRUB SERPL-MCNC: 0.2 MG/DL
BUN SERPL-MCNC: 13.8 MG/DL (ref 6–20)
C TRACH DNA SPEC QL NAA+PROBE: NEGATIVE
CALCIUM SERPL-MCNC: 9.1 MG/DL (ref 8.6–10)
CHLORIDE SERPL-SCNC: 104 MMOL/L (ref 98–107)
CREAT SERPL-MCNC: 1.01 MG/DL (ref 0.67–1.17)
DEPRECATED HCO3 PLAS-SCNC: 28 MMOL/L (ref 22–29)
EGFRCR SERPLBLD CKD-EPI 2021: >90 ML/MIN/1.73M2
GLUCOSE SERPL-MCNC: 103 MG/DL (ref 70–99)
HCV AB SERPL QL IA: NONREACTIVE
HIV 1+2 AB+HIV1 P24 AG SERPL QL IA: NONREACTIVE
N GONORRHOEA DNA SPEC QL NAA+PROBE: NEGATIVE
POTASSIUM SERPL-SCNC: 4.7 MMOL/L (ref 3.4–5.3)
PROT SERPL-MCNC: 6.2 G/DL (ref 6.4–8.3)
SODIUM SERPL-SCNC: 140 MMOL/L (ref 136–145)
T PALLIDUM AB SER QL: NONREACTIVE

## 2023-09-06 PROCEDURE — 80053 COMPREHEN METABOLIC PANEL: CPT

## 2023-09-06 PROCEDURE — 86803 HEPATITIS C AB TEST: CPT

## 2023-09-06 PROCEDURE — 36415 COLL VENOUS BLD VENIPUNCTURE: CPT

## 2023-09-06 PROCEDURE — 87491 CHLMYD TRACH DNA AMP PROBE: CPT

## 2023-09-06 PROCEDURE — 87591 N.GONORRHOEAE DNA AMP PROB: CPT

## 2023-09-06 PROCEDURE — 87389 HIV-1 AG W/HIV-1&-2 AB AG IA: CPT

## 2023-09-06 PROCEDURE — 86780 TREPONEMA PALLIDUM: CPT

## 2023-09-08 NOTE — RESULT ENCOUNTER NOTE
Henry,  - the kidney, liver and electrolyte panel was normal (your glucose was fine as you were not likely fasting for the blood work).  - Your STD screening tests (chlamydia, gonorrhea, HIV, Hepatitis C and syphilis) were all negative.   Please MyChart or call if you have any concerns or questions.   Sincerely,  Stephanie Pelayo MD

## 2024-03-09 ENCOUNTER — HEALTH MAINTENANCE LETTER (OUTPATIENT)
Age: 24
End: 2024-03-09

## 2024-05-15 ENCOUNTER — OFFICE VISIT (OUTPATIENT)
Dept: FAMILY MEDICINE | Facility: CLINIC | Age: 24
End: 2024-05-15
Payer: COMMERCIAL

## 2024-05-15 VITALS
HEIGHT: 64 IN | SYSTOLIC BLOOD PRESSURE: 128 MMHG | DIASTOLIC BLOOD PRESSURE: 81 MMHG | TEMPERATURE: 98.7 F | RESPIRATION RATE: 16 BRPM | OXYGEN SATURATION: 99 % | WEIGHT: 138 LBS | BODY MASS INDEX: 23.56 KG/M2 | HEART RATE: 106 BPM

## 2024-05-15 DIAGNOSIS — Z11.3 SCREEN FOR STD (SEXUALLY TRANSMITTED DISEASE): ICD-10-CM

## 2024-05-15 DIAGNOSIS — Z71.89 ENCOUNTER FOR COUNSELING BEFORE STARTING AND ABOUT PRE-EXPOSURE PROPHYLAXIS FOR HIV: ICD-10-CM

## 2024-05-15 DIAGNOSIS — Z29.81 ENCOUNTER FOR COUNSELING BEFORE STARTING AND ABOUT PRE-EXPOSURE PROPHYLAXIS FOR HIV: ICD-10-CM

## 2024-05-15 DIAGNOSIS — Z00.00 ROUTINE GENERAL MEDICAL EXAMINATION AT A HEALTH CARE FACILITY: Primary | ICD-10-CM

## 2024-05-15 PROCEDURE — 36415 COLL VENOUS BLD VENIPUNCTURE: CPT | Performed by: FAMILY MEDICINE

## 2024-05-15 PROCEDURE — 87591 N.GONORRHOEAE DNA AMP PROB: CPT | Performed by: FAMILY MEDICINE

## 2024-05-15 PROCEDURE — 99395 PREV VISIT EST AGE 18-39: CPT | Performed by: FAMILY MEDICINE

## 2024-05-15 PROCEDURE — 87491 CHLMYD TRACH DNA AMP PROBE: CPT | Performed by: FAMILY MEDICINE

## 2024-05-15 PROCEDURE — 87389 HIV-1 AG W/HIV-1&-2 AB AG IA: CPT | Performed by: FAMILY MEDICINE

## 2024-05-15 PROCEDURE — 86780 TREPONEMA PALLIDUM: CPT | Performed by: FAMILY MEDICINE

## 2024-05-15 RX ORDER — EMTRICITABINE AND TENOFOVIR DISOPROXIL FUMARATE 200; 300 MG/1; MG/1
TABLET, FILM COATED ORAL
Qty: 20 TABLET | Refills: 2 | Status: SHIPPED | OUTPATIENT
Start: 2024-05-15 | End: 2024-07-25

## 2024-05-15 SDOH — HEALTH STABILITY: PHYSICAL HEALTH: ON AVERAGE, HOW MANY DAYS PER WEEK DO YOU ENGAGE IN MODERATE TO STRENUOUS EXERCISE (LIKE A BRISK WALK)?: 5 DAYS

## 2024-05-15 SDOH — HEALTH STABILITY: PHYSICAL HEALTH: ON AVERAGE, HOW MANY MINUTES DO YOU ENGAGE IN EXERCISE AT THIS LEVEL?: 90 MIN

## 2024-05-15 ASSESSMENT — SOCIAL DETERMINANTS OF HEALTH (SDOH): HOW OFTEN DO YOU GET TOGETHER WITH FRIENDS OR RELATIVES?: TWICE A WEEK

## 2024-05-15 NOTE — COMMUNITY RESOURCES LIST (ENGLISH)
May 15, 2024           YOUR PERSONALIZED LIST OF SERVICES & PROGRAMS           & SHELTER    Housing      [facility]  Phone: [phone]  Website: [email or web address]  Language: English, Hmong, Oromo, Nauruan, Kiswahili  Hours: Mon 9:00 AM - 5:00 PM Tue 9:00 AM - 5:00 PM Wed 9:00 AM - 5:00 PM Thu 9:00 AM - 5:00 PM Fri 9:00 AM - 5:00 PM  Fee: Insurance  Accessibility: Blind accommodation, Deaf or hard of hearing, Translation services  Transportation Options: Free transportation      [facility] - Housing Stabilization Services  Phone: (934) 865-5300  Website: https://[employer]/Housing-Stabilization.html  Language: English  Hours: Mon 9:00 AM - 5:00 PM Tue 9:00 AM - 5:00 PM Wed 9:00 AM - 5:00 PM Thu 9:00 AM - 5:00 PM Fri 9:00 AM - 5:00 PM  Fee: Insurance  Accessibility: Deaf or hard of hearing, Translation services    Case Management      Living - Housing Stabilization Services  [address] [place] [address] (Distance: 22.7 miles)  Phone: (191) [phone]  Website: https://www.Currently  Language: Indonesian, English, Nauruan  Fee: Insurance, Self pay      International - Community Health Worker [facility]  [address] [address] (Distance: 22.6 miles)  Phone: (787) 482-6188  Email: tiffany@[employer]  Website: https://OuiCar.org/  Language: English, Nauruan, Indonesian  Fee: Free, Insurance      [employer] - Housing Stabilization Services  Phone: (692) 486-1836  Website: https://homebasemn.com/  Language: English  Hours: Mon 8:00 AM - 4:00 PM Tue 8:00 AM - 4:00 PM Wed 8:00 AM - 4:00 PM Thu 8:00 AM - 4:00 PM Fri 8:00 AM - 4:00 PM  Fee: Free  Accessibility: Blind accommodation, Deaf or hard of hearing  Transportation Options: Free transportation    Drop-In Services      [facility] - Drop-in center or day shelter  [address] [place] [address] [address] [place] [address]  (Distance: 5.1 miles)  Phone: (508) 263-1958  Website: http://www.CamPlex.org/  Language: English  Fee: [facility] - Warming or cooling center - [facility]  [address] [place] [address] (Distance: 2.9 miles)  Language: English  Fee: Free      [employer] [employer] [facility]  Website: http://www.laundrylove.org               IMPORTANT NUMBERS & WEBSITES        Emergency Services  911  .   United Way  211 http://211unitedway.org  .   Poison Control  (176) 971-8487 http://[place]poison.org http://wis[place]poison.org  .     Suicide and Crisis Lifeline  988 http://988HeySpaceline.org  .   Childhelp [place] Child Abuse Hotline  599.816.5081 http://Childhelphotline.org   .   [place] Sexual Assault Hotline  (912) 914-5602 (HOPE) http://Lendstarn.org   .     [place] Runaway Safeline  (835) 655-8021 (RUNAWAY) http://[employer].Ticket Monster ([place])  .   Pregnancy & Postpartum Support  Call/text 428-708-2445  [place]: http://ppsupportmn.org  [place]: http://NanoICE.com/wi  .   Substance Abuse National Helpline ([facility])  289-667-HELP (2141) http://Findtreatment.gov   .                DISCLAIMER: These resources have been generated via the [employer]moco Platform. [employer] does not endorse any service providers mentioned in this resource list. [employer] does not guarantee that the services mentioned in this resource list will be available to you or will improve your health or wellness.    [facility]

## 2024-05-15 NOTE — PATIENT INSTRUCTIONS
"2 pills 2-24 hours before sex, 1 pill 24 hours after the first dose, and 1 pill 24 hours after the second dose.      Preventive Care Advice   This is general advice we often give to help people stay healthy. Your care team may have specific advice just for you. Please talk to your care team about your own preventive care needs.  Lifestyle  Exercise at least 150 minutes each week (30 minutes a day, 5 days a week).  Do muscle strengthening activities 2 days a week. These help control your weight and prevent disease.  No smoking.  Wear sunscreen to prevent skin cancer.  Have your home tested for radon every 2 to 5 years. Radon is a colorless, odorless gas that can harm your lungs. To learn more, go to www.health.[facility].mn.us and search for \"Radon in Homes.\"  Keep guns unloaded and locked up in a safe place like a safe or gun vault, or, use a gun lock and hide the keys. Always lock away bullets separately. To learn more, visit [employer].com.mn.gov and search for \"safe gun storage.\"  Nutrition  Eat 5 or more servings of fruits and vegetables each day.  Try wheat bread, brown rice and whole grain pasta (instead of white bread, rice, and pasta).  Get enough calcium and vitamin D. Check the label on foods and aim for 100% of the RDA (recommended daily allowance).  Regular exams  Have a dental exam and cleaning every 6 months.  See your health care team every year to talk about:  Any changes in your health.  Any medicines your care team has prescribed.  Preventive care, family planning, and ways to prevent chronic diseases.  Shots (vaccines)   HPV shots (up to age 26), if you've never had them before.  Hepatitis B shots (up to age 59), if you've never had them before.  COVID-19 shot: Get this shot when it's due.  Flu shot: Get a flu shot every year.  Tetanus shot: Get a tetanus shot every 10 years.  Pneumococcal, hepatitis A, and RSV shots: Ask your care team if you need these based on your risk.  Shingles shot (for age 50 and " up).  General health tests  Diabetes screening:  Starting at age 35, Get screened for diabetes at least every 3 years.  If you are younger than age 35, ask your care team if you should be screened for diabetes.  Cholesterol test: At age 39, start having a cholesterol test every 5 years, or more often if advised.  Bone density scan (DEXA): At age 50, ask your care team if you should have this scan for osteoporosis (brittle bones).  Hepatitis C: Get tested at least once in your life.  Abdominal aortic aneurysm screening: Talk to your doctor about having this screening if you:  Have ever smoked; and  Are biologically male; and  Are between the ages of 65 and 75.  STIs (sexually transmitted infections)  Before age 24: Ask your care team if you should be screened for STIs.  After age 24: Get screened for STIs if you're at risk. You are at risk for STIs (including HIV) if:  You are sexually active with more than one person.  You don't use condoms every time.  You or a partner was diagnosed with a sexually transmitted infection.  If you are at risk for HIV, ask about PrEP medicine to prevent HIV.  Get tested for HIV at least once in your life, whether you are at risk for HIV or not.  Cancer screening tests  Cervical cancer screening: If you have a cervix, begin getting regular cervical cancer screening tests at age 21. Most people who have regular screenings with normal results can stop after age 65. Talk about this with your provider.  Breast cancer scan (mammogram): If you've ever had breasts, begin having regular mammograms starting at age 40. This is a scan to check for breast cancer.  Colon cancer screening: It is important to start screening for colon cancer at age 45.  Have a colonoscopy test every 10 years (or more often if you're at risk) Or, ask your provider about stool tests like a FIT test every year or Cologuard test every 3 years.  To learn more about your testing options, visit:  www.I.[employer]/083076.pdf.  For help making a decision, visit: patience.yuval/lt86859.  Prostate cancer screening test: If you have a prostate and are age 55 to 69, ask your provider if you would benefit from a yearly prostate cancer screening test.  Lung cancer screening: If you are a current or former smoker age 50 to 80, ask your care team if ongoing lung cancer screenings are right for you.  For informational purposes only. Not to replace the advice of your health care provider. Copyright   2023 [facility] [employer]. All rights reserved. Clinically reviewed by the [facility] Transitions Program. ReClaims [id number] - REV 04/24.    Learning About Stress  What is stress?     Stress is your body's response to a hard situation. Your body can have a physical, emotional, or mental response. Stress is a fact of life for most people, and it affects everyone differently. What causes stress for you may not be stressful for someone else.  A lot of things can cause stress. You may feel stress when you go on a job interview, take a test, or run a race. This kind of short-term stress is normal and even useful. It can help you if you need to work hard or react quickly. For example, stress can help you finish an important job on time.  Long-term stress is caused by ongoing stressful situations or events. Examples of long-term stress include long-term health problems, ongoing problems at work, or conflicts in your family. Long-term stress can harm your health.  How does stress affect your health?  When you are stressed, your body responds as though you are in danger. It makes hormones that speed up your heart, make you breathe faster, and give you a burst of energy. This is called the fight-or-flight stress response. If the stress is over quickly, your body goes back to normal and no harm is done.  But if stress happens too often or lasts too long, it can have bad effects. Long-term stress can make you more likely to get sick,  and it can make symptoms of some diseases worse. If you tense up when you are stressed, you may develop neck, shoulder, or low back pain. Stress is linked to high blood pressure and heart disease.  Stress also harms your emotional health. It can make you dominguez, tense, or depressed. Your relationships may suffer, and you may not do well at work or school.  What can you do to manage stress?  You can try these things to help manage stress:   Do something active. Exercise or activity can help reduce stress. Walking is a great way to get started. Even everyday activities such as housecleaning or yard work can help.  Try yoga or jaclyn chi. These techniques combine exercise and meditation. You may need some training at first to learn them.  Do something you enjoy. For example, listen to music or go to a movie. Practice your hobby or do volunteer work.  Meditate. This can help you relax, because you are not worrying about what happened before or what may happen in the future.  Do guided imagery. Imagine yourself in any setting that helps you feel calm. You can use online videos, books, or a teacher to guide you.  Do breathing exercises. For example:  From a standing position, bend forward from the waist with your knees slightly bent. Let your arms dangle close to the floor.  Breathe in slowly and deeply as you return to a standing position. Roll up slowly and lift your head last.  Hold your breath for just a few seconds in the standing position.  Breathe out slowly and bend forward from the waist.  Let your feelings out. Talk, laugh, cry, and express anger when you need to. Talking with supportive friends or family, a counselor, or a stephani leader about your feelings is a healthy way to relieve stress. Avoid discussing your feelings with people who make you feel worse.  Write. It may help to write about things that are bothering you. This helps you find out how much stress you feel and what is causing it. When you know this,  "you can find better ways to cope.  What can you do to prevent stress?  You might try some of these things to help prevent stress:  Manage your time. This helps you find time to do the things you want and need to do.  Get enough sleep. Your body recovers from the stresses of the day while you are sleeping.  Get support. Your family, friends, and community can make a difference in how you experience stress.  Limit your news feed. Avoid or limit time on social media or news that may make you feel stressed.  Do something active. Exercise or activity can help reduce stress. Walking is a great way to get started.  Where can you learn more?  Go to https://www.[employer].net/patiented  Enter N032 in the search box to learn more about \"Learning About Stress.\"  Current as of: October 24, 2023               Content Version: 14.0    7165-4220 [employer].   Care instructions adapted under license by your healthcare professional. If you have questions about a medical condition or this instruction, always ask your healthcare professional. [employer] disclaims any warranty or liability for your use of this information.      Substance Use Disorder: Care Instructions  Overview     You can improve your life and health by stopping your use of alcohol or drugs. When you don't drink or use drugs, you may feel and sleep better. You may get along better with your family, friends, and coworkers. There are medicines and programs that can help with substance use disorder.  How can you care for yourself at home?  Here are some ways to help you stay sober and prevent relapse.  If you have been given medicine to help keep you sober or reduce your cravings, be sure to take it exactly as prescribed.  Talk to your doctor about programs that can help you stop using drugs or drinking alcohol.  Do not keep alcohol or drugs in your home.  Plan ahead. Think about what you'll say if other people ask you to drink or use drugs. " Try not to spend time with people who drink or use drugs.  Use the time and money spent on drinking or drugs to do something that's important to you.  Preventing a relapse  Have a plan to deal with relapse. Learn to recognize changes in your thinking that lead you to drink or use drugs. Get help before you start to drink or use drugs again.  Try to stay away from situations, friends, or places that may lead you to drink or use drugs.  If you feel the need to drink alcohol or use drugs again, seek help right away. Call a trusted friend or family member. Some people get support from organizations such as Narcotics Anonymous or [employer] or from treatment facilities.  If you relapse, get help as soon as you can. Some people make a plan with another person that outlines what they want that person to do for them if they relapse. The plan usually includes how to handle the relapse and who to notify in case of relapse.  Don't give up. Remember that a relapse doesn't mean that you have failed. Use the experience to learn the triggers that lead you to drink or use drugs. Then quit again. Recovery is a lifelong process. Many people have several relapses before they are able to quit for good.  Follow-up care is a key part of your treatment and safety. Be sure to make and go to all appointments, and call your doctor if you are having problems. It's also a good idea to know your test results and keep a list of the medicines you take.  When should you call for help?   Call 911  anytime you think you may need emergency care. For example, call if you or someone else:    Has overdosed or has withdrawal signs. Be sure to tell the emergency workers that you are or someone else is using or trying to quit using drugs. Overdose or withdrawal signs may include:  Losing consciousness.  Seizure.  Seeing or hearing things that aren't there (hallucinations).     Is thinking or talking about suicide or harming others.   Where to get help  "24 hours a day, 7 days a week   If you or someone you know talks about suicide, self-harm, a mental health crisis, a substance use crisis, or any other kind of emotional distress, get help right away. You can:    Call the Suicide and Crisis Lifeline at 988.     Call 0-302-849-TALK (1-602.491.7331).     Text HOME to 650028 to access the Crisis Text Line.   Consider saving these numbers in your phone.  Go to [employer] for more information or to chat online.  Call your doctor now or seek immediate medical care if:    You are having withdrawal symptoms. These may include nausea or vomiting, sweating, shakiness, and anxiety.   Watch closely for changes in your health, and be sure to contact your doctor if:    You have a relapse.     You need more help or support to stop.   Where can you learn more?  Go to https://www.[employer]/patiented  Enter H573 in the search box to learn more about \"Substance Use Disorder: Care Instructions.\"  Current as of: November 15, 2023               Content Version: 14.0    5562-2219 [employer].   Care instructions adapted under license by your healthcare professional. If you have questions about a medical condition or this instruction, always ask your healthcare professional. [employer] disclaims any warranty or liability for your use of this information.      Safer Sex: Care Instructions  Overview  Safer sex is a way to reduce your risk of getting a sexually transmitted infection (STI). It can also help prevent pregnancy.  Several products can help you practice safer sex and reduce your chance of STIs. One of the best is a condom. There are internal and external condoms. You can use a special rubber sheet (dental dam) for protection during oral sex. Disposable gloves can keep your hands from touching blood, semen, or other body fluids that can carry infections.  Remember that birth control methods such as diaphragms, IUDs, foams, and birth control pills " do not stop you from getting STIs.  Follow-up care is a key part of your treatment and safety. Be sure to make and go to all appointments, and call your doctor if you are having problems. It's also a good idea to know your test results and keep a list of the medicines you take.  How can you care for yourself at home?  Think about getting vaccinated to help prevent hepatitis A, hepatitis B, and human papillomavirus (HPV). They can be spread through sex.  Use a condom every time you have sex. Use an external condom, which goes on the penis. Or use an internal condom, which goes into the vagina or anus.  Make sure you use the right size external condom. A condom that's too small can break easily. A condom that's too big can slip off during sex.  Use a new condom each time you have sex. Be careful not to poke a hole in the condom when you open the wrapper.  Don't use an internal condom and an external condom at the same time.  Never use petroleum jelly (such as Vaseline), grease, hand lotion, baby oil, or anything with oil in it. These products can make holes in the condom.  After intercourse, hold the edge of the condom as you remove it. This will help keep semen from spilling out of the condom.  Do not have sex with anyone who has symptoms of an STI, such as sores on the genitals or mouth.  Do not drink a lot of alcohol or use drugs before sex.  Limit your sex partners. Sex with one partner who has sex only with you can reduce your risk of getting an STI.  Don't share sex toys. But if you do share them, use a condom and clean the sex toys between each use.  Talk to any partners before you have sex. Talk about what you feel comfortable with and whether you have any boundaries with sex. And find out if your partner or partners may be at risk for any STI. Keep in mind that a person may be able to spread an STI even if they do not have symptoms. You and any partners may want to get tested for STIs.  Where can you learn  "more?  Go to https://www.[employer].net/patiented  Enter B608 in the search box to learn more about \"Safer Sex: Care Instructions.\"  Current as of: November 27, 2023               Content Version: 14.0    0844-4486 [employer], [employer].   Care instructions adapted under license by your healthcare professional. If you have questions about a medical condition or this instruction, always ask your healthcare professional. Healthwise, [employer] disclaims any warranty or liability for your use of this information.      "

## 2024-05-15 NOTE — PROGRESS NOTES
Preventive Care Visit  [facility] Hector Carvalho MD, Family Medicine  May 15, 2024      Assessment & Plan     Routine general medical examination at a health care facility  Doing great overall.  Stays very physically active.  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  We had a brief discussion about the possibility of ADHD treatment when school starts if needed.  This has been an issue in the past.  Just contact me whenever.    Encounter for counseling before starting and about pre-exposure prophylaxis for HIV  Intermittent usage of prep supported as per protocol.  - emtricitabine-tenofovir (TRUVADA) 200-300 MG per tablet; 2 tabs prior to sexual activity, then 1 tab daily x 2 days.    Screen for STD (sexually transmitted disease)    - NEISSERIA GONORRHOEA PCR; Future  - CHLAMYDIA TRACHOMATIS PCR; Future  - HIV Antigen Antibody Combo Cascade; Future  - Treponema Abs w Reflex to RPR and Titer; Future  - NEISSERIA GONORRHOEA PCR  - CHLAMYDIA TRACHOMATIS PCR  - HIV Antigen Antibody Combo Cascade  - Treponema Abs w Reflex to RPR and Titer    Patient has been advised of split billing requirements and indicates understanding: Yes        Counseling  Appropriate preventive services were discussed with this patient, including applicable screening as appropriate for fall prevention, nutrition, physical activity, Tobacco-use cessation, weight loss and cognition.  Checklist reviewing preventive services available has been given to the patient.  Reviewed patient's diet, addressing concerns and/or questions.   He is at risk for psychosocial distress and has been provided with information to reduce risk.       See Patient Instructions    Ilene Figueroa is a 23 year old, presenting for the following:  Physical        5/15/2024     1:31 PM   Additional Questions   Roomed by Ev BURTON   Accompanied by self        Health Care Directive  Patient does not have a Health Care Directive or  Living Will: Discussed advance care planning with patient; information given to patient to review.    Healthy Habits:     Getting at least 3 servings of Calcium per day:  Yes    Bi-annual eye exam:  Yes    Dental care twice a year:  Yes    Sleep apnea or symptoms of sleep apnea:  None    Diet:  Other    Frequency of exercise:  4-5 days/week    Duration of exercise:  Greater than 60 minutes    Taking medications regularly:  Yes    Barriers to taking medications:  None    Medication side effects:  Other    Additional concerns today:  No    Here today for routine checkup  Doing well.  Reports no interval health concerns.          5/15/2024   General Health   How would you rate your overall physical health? Excellent   Feel stress (tense, anxious, or unable to sleep) Only a little   (!) STRESS CONCERN      5/15/2024   Nutrition   Three or more servings of calcium each day? Yes   Diet: Other   If other, please elaborate: high protein   How many servings of fruit and vegetables per day? (!) 2-3   How many sweetened beverages each day? 0-1         5/15/2024   Exercise   Days per week of moderate/strenous exercise 5 days   Average minutes spent exercising at this level 90 min         5/15/2024   Social Factors   Frequency of gathering with friends or relatives Twice a week   Worry food won't last until get money to buy more No   Food not last or not have enough money for food? No   Do you have housing?  No   Are you worried about losing your housing? No   Lack of transportation? No   Unable to get utilities (heat,electricity)? No   Want help with housing or utility concern? No   (!) HOUSING CONCERN PRESENT      5/15/2024   Dental   Dentist two times every year? Yes         5/15/2024   TB Screening   Were you born outside of the [place]? No         Today's PHQ-2 Score:       5/15/2024     1:29 PM   PHQ-2 ( 1999 [employer])   Q1: Little interest or pleasure in doing things 0   Q2: Feeling down, depressed or hopeless 0   PHQ-2 Score 0    Q1: Little interest or pleasure in doing things Not at all   Q2: Feeling down, depressed or hopeless Not at all   PHQ-2 Score 0           5/15/2024   Substance Use   Alcohol more than 3/day or more than 7/wk No   Do you use any other substances recreationally? (!) ALCOHOL    (!) CANNABIS PRODUCTS     Social History     Tobacco Use    Smoking status: Never     Passive exposure: Never    Smokeless tobacco: Never   Substance Use Topics    Alcohol use: Yes     Comment: occasional    Drug use: Never           5/15/2024   STI Screening   New sexual partner(s) since last STI/HIV test? (!) YES          5/15/2024   Contraception/Family Planning   Questions about contraception or family planning No        Reviewed and updated as needed this visit by Provider   Tobacco  Allergies  Meds  Problems  Med Hx  Surg Hx  Fam Hx            Lab work is in process  Labs reviewed in EPIC  BP Readings from Last 3 Encounters:   05/15/24 128/81   01/31/23 120/76   12/15/21 133/78    Wt Readings from Last 3 Encounters:   05/15/24 62.6 kg (138 lb)   01/31/23 57.7 kg (127 lb 3.2 oz)   12/15/21 57.7 kg (127 lb 1.6 oz)                      Review of Systems  CONSTITUTIONAL: NEGATIVE for fever, chills, change in weight  INTEGUMENTARY/SKIN: NEGATIVE for worrisome rashes, moles or lesions  EYES: NEGATIVE for vision changes or irritation  ENT/MOUTH: NEGATIVE for ear, mouth and throat problems  RESP: NEGATIVE for significant cough or SOB  BREAST: NEGATIVE for masses, tenderness or discharge  CV: NEGATIVE for chest pain, palpitations or peripheral edema  GI: NEGATIVE for nausea, abdominal pain, heartburn, or change in bowel habits  : NEGATIVE for frequency, dysuria, or hematuria  MUSCULOSKELETAL: NEGATIVE for significant arthralgias or myalgia  NEURO: NEGATIVE for weakness, dizziness or paresthesias  ENDOCRINE: NEGATIVE for temperature intolerance, skin/hair changes  HEME: NEGATIVE for bleeding problems  PSYCHIATRIC: NEGATIVE for changes in  "mood or affect     Objective    Exam  /81 (BP Location: Right arm, Patient Position: Sitting, Cuff Size: Adult Regular)   Pulse 106   Temp 98.7  F (37.1  C) (Oral)   Resp 16   Ht 1.619 m (5' 3.75\")   Wt 62.6 kg (138 lb)   SpO2 99%   BMI 23.87 kg/m     Estimated body mass index is 23.87 kg/m  as calculated from the following:    Height as of this encounter: 1.619 m (5' 3.75\").    Weight as of this encounter: 62.6 kg (138 lb).    Physical Exam  GENERAL: alert and no distress  EYES: Eyes grossly normal to inspection, PERRL and conjunctivae and sclerae normal  HENT: ear canals and TM's normal, nose and mouth without ulcers or lesions  NECK: no adenopathy, no asymmetry, masses, or scars  RESP: lungs clear to auscultation - no rales, rhonchi or wheezes  CV: regular rate and rhythm, normal S1 S2, no S3 or S4, no murmur, click or rub, no peripheral edema  ABDOMEN: soft, nontender, no hepatosplenomegaly, no masses and bowel sounds normal  MS: no gross musculoskeletal defects noted, no edema  SKIN: no suspicious lesions or rashes  NEURO: Normal strength and tone, mentation intact and speech normal  PSYCH: mentation appears normal, affect normal/bright        Signed Electronically by: Susie Carvalho MD    "

## 2024-05-16 LAB
C TRACH DNA SPEC QL NAA+PROBE: NEGATIVE
HIV 1+2 AB+HIV1 P24 AG SERPL QL IA: NONREACTIVE
N GONORRHOEA DNA SPEC QL NAA+PROBE: NEGATIVE
T PALLIDUM AB SER QL: NONREACTIVE

## 2024-05-17 NOTE — RESULT ENCOUNTER NOTE
Henry,  My apologies as I was out of the office yesterday.  Your testing is all negative.  Great news.  JERICHO Carvalho M.D.

## 2024-07-25 ENCOUNTER — TELEPHONE (OUTPATIENT)
Dept: FAMILY MEDICINE | Facility: CLINIC | Age: 24
End: 2024-07-25
Payer: COMMERCIAL

## 2024-07-25 DIAGNOSIS — Z29.81 ENCOUNTER FOR COUNSELING BEFORE STARTING AND ABOUT PRE-EXPOSURE PROPHYLAXIS FOR HIV: ICD-10-CM

## 2024-07-25 DIAGNOSIS — Z71.89 ENCOUNTER FOR COUNSELING BEFORE STARTING AND ABOUT PRE-EXPOSURE PROPHYLAXIS FOR HIV: ICD-10-CM

## 2024-07-25 RX ORDER — EMTRICITABINE AND TENOFOVIR DISOPROXIL FUMARATE 200; 300 MG/1; MG/1
TABLET, FILM COATED ORAL
Qty: 30 TABLET | Refills: 2 | Status: SHIPPED | OUTPATIENT
Start: 2024-07-25

## 2024-07-25 NOTE — TELEPHONE ENCOUNTER
No worries.  I will adjust the quantity to 30.  The use of this medication as a pre and postexposure prophylaxis on an as-needed basis is an FDA approved regimen.  That is why the prescription is written that way.

## 2024-07-25 NOTE — CONFIDENTIAL NOTE
Received Message From [employer] to clarify Directions/Qty Mismatch.  The directions and quantity don't match.  Please update quantity to dispense to at least 30 tablets, we are unable to dispense less than 30 per fill.    Thank you!  Becca

## 2024-07-25 NOTE — CONFIDENTIAL NOTE
Medication Question or Refill        What medication are you calling about (include dose and sig)?: emtricitabine-tenofovir (TRUVADA) 200-300 MG per tablet    Preferred Pharmacy:   [address]  [place] [address] [address]  Phone: [phone]        Controlled Substance Agreement on file:   CSA -- Patient Level:    CSA: None found at the patient level.       Who prescribed the medication?: Dr. Carvalho    Do you need a refill? Yes      Patient offered an appointment? No    Do you have any questions or concerns?  No      Could we send this information to you in [place] or would you prefer to receive a phone call?:   Patient would prefer a phone call   Okay to leave a detailed message?: Yes at Home number on file [phone] (home)

## 2024-07-29 ENCOUNTER — TELEPHONE (OUTPATIENT)
Dept: FAMILY MEDICINE | Facility: CLINIC | Age: 24
End: 2024-07-29
Payer: COMMERCIAL

## 2024-07-29 NOTE — TELEPHONE ENCOUNTER
Pharmacist called and was looking to clarify a prescription that was transferred to them from [facility].  Pharmacist was verifying the 20 tablets as they don't usually split a bottle of 30.  Per chart review, this was incorrect, the original prescription was for 30 tablets not 20.        Gave verbal order for the prescription as it was written to the pharmacist since the transfer paperwork was incorrect.  Pharmacist did a read back and it was correct.      Kristina Kjellberg, MSN, RN

## 2025-02-12 ENCOUNTER — VIRTUAL VISIT (OUTPATIENT)
Dept: PSYCHOLOGY | Facility: CLINIC | Age: 25
End: 2025-02-12
Payer: COMMERCIAL

## 2025-02-12 DIAGNOSIS — Z13.39 ADHD (ATTENTION DEFICIT HYPERACTIVITY DISORDER) EVALUATION: ICD-10-CM

## 2025-02-12 DIAGNOSIS — F42.2 MIXED OBSESSIONAL THOUGHTS AND ACTS: Primary | ICD-10-CM

## 2025-02-12 PROCEDURE — 90791 PSYCH DIAGNOSTIC EVALUATION: CPT | Mod: 95

## 2025-02-12 ASSESSMENT — ANXIETY QUESTIONNAIRES
GAD7 TOTAL SCORE: 7
4. TROUBLE RELAXING: SEVERAL DAYS
2. NOT BEING ABLE TO STOP OR CONTROL WORRYING: SEVERAL DAYS
IF YOU CHECKED OFF ANY PROBLEMS ON THIS QUESTIONNAIRE, HOW DIFFICULT HAVE THESE PROBLEMS MADE IT FOR YOU TO DO YOUR WORK, TAKE CARE OF THINGS AT HOME, OR GET ALONG WITH OTHER PEOPLE: SOMEWHAT DIFFICULT
GAD7 TOTAL SCORE: 7
8. IF YOU CHECKED OFF ANY PROBLEMS, HOW DIFFICULT HAVE THESE MADE IT FOR YOU TO DO YOUR WORK, TAKE CARE OF THINGS AT HOME, OR GET ALONG WITH OTHER PEOPLE?: SOMEWHAT DIFFICULT
3. WORRYING TOO MUCH ABOUT DIFFERENT THINGS: SEVERAL DAYS
5. BEING SO RESTLESS THAT IT IS HARD TO SIT STILL: SEVERAL DAYS
7. FEELING AFRAID AS IF SOMETHING AWFUL MIGHT HAPPEN: SEVERAL DAYS
GAD7 TOTAL SCORE: 7
1. FEELING NERVOUS, ANXIOUS, OR ON EDGE: SEVERAL DAYS
6. BECOMING EASILY ANNOYED OR IRRITABLE: SEVERAL DAYS
7. FEELING AFRAID AS IF SOMETHING AWFUL MIGHT HAPPEN: SEVERAL DAYS

## 2025-02-12 ASSESSMENT — PATIENT HEALTH QUESTIONNAIRE - PHQ9
SUM OF ALL RESPONSES TO PHQ QUESTIONS 1-9: 4
SUM OF ALL RESPONSES TO PHQ QUESTIONS 1-9: 4
10. IF YOU CHECKED OFF ANY PROBLEMS, HOW DIFFICULT HAVE THESE PROBLEMS MADE IT FOR YOU TO DO YOUR WORK, TAKE CARE OF THINGS AT HOME, OR GET ALONG WITH OTHER PEOPLE: NOT DIFFICULT AT ALL

## 2025-02-12 NOTE — PROGRESS NOTES
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 order to: Measure a psychological disorder and its severity and functional impairment to determine psychiatric diagnosis when a mental illness is suspected, or to achieve a differential diagnosis from a range of medical/psychological disorders that present with similar constellations of symptoms (e.g., determination and measurement of anxiety severity and impact in the presence of ongoing asthma or heart disease), Perform symptom measurement to objectively measure treatment effectiveness and/or determine the need to refer for pharmacological treatment or other medical evaluation (e.g., based on severity and chronicity of symptoms), and Evaluate primary symptoms of impaired attention and concentration that can occur in many neurological and psychiatric conditions.     Medical necessity for psychological assessment is warranted as a result of the following: (1) A specific clinical question is posed that relates to the condition/symptoms being addressed (2) The question cannot be adequately addressed by clinical interview and/or behavioral observation (3) Results of psychological testing are reasonably expected to provide an answer to the query (4) It is reasonably expected that the testing will provide  information leading to a clearer diagnosis and/or guide treatment planning with an expectation of improved clinical outcome.    7. JOHNNIE:  Discussed the general effects of drugs and alcohol on health and well-being. Clinician did recommend structured treatment and or community support (AA, 12 step group, etc.).    8. Records:   These were reviewed at time of assessment.   Information in this assessment was obtained from the medical record and  provided by patient who is a good historian. Patient will have open access to his mental health medical record.    9. Interactive Complexity: No     10. Safety Plan: N/A.    Parts of this documentation may have been completed using dictation software. Potential errors may result and are unintentional.     Ashley Long PsyD, Postdoctoral Fellow  February 12, 2025    Clinical supervision and documentation review provided by Cyndie Cole, PhD   2/12/2025

## 2025-03-06 ENCOUNTER — VIRTUAL VISIT (OUTPATIENT)
Dept: PSYCHOLOGY | Facility: CLINIC | Age: 25
End: 2025-03-06
Payer: COMMERCIAL

## 2025-03-06 DIAGNOSIS — F42.2 MIXED OBSESSIONAL THOUGHTS AND ACTS: Primary | ICD-10-CM

## 2025-03-06 ASSESSMENT — ANXIETY QUESTIONNAIRES
GAD7 TOTAL SCORE: 7
7. FEELING AFRAID AS IF SOMETHING AWFUL MIGHT HAPPEN: SEVERAL DAYS
8. IF YOU CHECKED OFF ANY PROBLEMS, HOW DIFFICULT HAVE THESE MADE IT FOR YOU TO DO YOUR WORK, TAKE CARE OF THINGS AT HOME, OR GET ALONG WITH OTHER PEOPLE?: SOMEWHAT DIFFICULT
2. NOT BEING ABLE TO STOP OR CONTROL WORRYING: SEVERAL DAYS
3. WORRYING TOO MUCH ABOUT DIFFERENT THINGS: SEVERAL DAYS
7. FEELING AFRAID AS IF SOMETHING AWFUL MIGHT HAPPEN: SEVERAL DAYS
GAD7 TOTAL SCORE: 7
4. TROUBLE RELAXING: SEVERAL DAYS
GAD7 TOTAL SCORE: 7
6. BECOMING EASILY ANNOYED OR IRRITABLE: SEVERAL DAYS
IF YOU CHECKED OFF ANY PROBLEMS ON THIS QUESTIONNAIRE, HOW DIFFICULT HAVE THESE PROBLEMS MADE IT FOR YOU TO DO YOUR WORK, TAKE CARE OF THINGS AT HOME, OR GET ALONG WITH OTHER PEOPLE: SOMEWHAT DIFFICULT
1. FEELING NERVOUS, ANXIOUS, OR ON EDGE: SEVERAL DAYS
5. BEING SO RESTLESS THAT IT IS HARD TO SIT STILL: SEVERAL DAYS

## 2025-03-06 ASSESSMENT — PATIENT HEALTH QUESTIONNAIRE - PHQ9
SUM OF ALL RESPONSES TO PHQ QUESTIONS 1-9: 1
10. IF YOU CHECKED OFF ANY PROBLEMS, HOW DIFFICULT HAVE THESE PROBLEMS MADE IT FOR YOU TO DO YOUR WORK, TAKE CARE OF THINGS AT HOME, OR GET ALONG WITH OTHER PEOPLE: NOT DIFFICULT AT ALL
SUM OF ALL RESPONSES TO PHQ QUESTIONS 1-9: 1

## 2025-03-06 NOTE — PROGRESS NOTES
"    Psychological Assessment Report    Patient: Henry Aguilar  YOB: 2000  MRN: [id number]  Dates of Assessment: 02/05/2025 and 02/12/2025   Referral Source: Valentin Carvalho MD [facility]  Reason for Referral: Assessing reported deficits in executive functioning.    IDENTIFYING INFORMATION AND BRIEF HISTORY OF PRESENTING PROBLEM: Henry Aguilar is a 24-year-old White man who presented to the initial diagnostic intake appointments on 02/05/2025 and 02/12/2025 (see diagnostic intake dated 02/12/2025 for more detailed background information) due to primary concerns with inattention, anxiety, OCD-related behaviors, and substance use difficulties.    He reported that, as a child, he had no difficulty with focusing, stating that \"everyone praised me for my academics,\" and he similarly denied challenges with completing chores, organization (\"I had the clean room\"), misplacing or losing items, or being disruptive in class. In high school, he described procrastination and time management difficulties with homework during his senior year, which he believes was related to the death of his grandmother around the same time. He recalled academic strengths in science and weakness in math, reporting that he generally earned As and Bs throughout school. 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 During his sophomore year, the COVID-19 pandemic led him to move back home, removing the frequent alcohol use from his environment, which allowed him to focus more on school. He ultimately graduated with a 3.2 GPA. 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     Currently, the patient reported difficulty sustaining attention (\"I couldn't focus on studying\"), not seeming to listen when spoken to (\"what people have told me is that things go in one ear and out the other\"), struggling to follow through on tasks (\"it's hard for me to get things done, accomplishing tasks at home and school is difficult\"), difficulty with organization (\"I have a structured but unstructured way of doing things, like the house is unorganized again after a couple of days, but I have a system and that system gets unorganized\"), avoiding or disliking tasks that require sustained mental effort (\"I had to write an email and I put it off a few weeks because it was more mental energy, while simple tasks I can get done relatively easily\"), frequently losing things (\"my wallet and keys, I go to the gym every morning and often times I'll forget my lunch or work clothes at home for after the gym\"), and being easily distracted (\"I get distracted very easily, it's hard to stay on top of things\"). Patient reported that he has not previously been assessed for ADHD and noted that these symptoms began in his late teens.     Mental Health History: The patient reported a history of mental health concerns, including OCD, depression, anxiety, and previous disordered eating behaviors. He experiences intrusive sexual thoughts and attempts to suppress them (\"I try to figure out a way to get rid of it\"), along with compulsive counting rituals that began in childhood (\"hand motion, like tapping fingers and putting down objects a certain amount of times; even numbers are good, for some reason the number 4 has a weird thing in my head, when I do something, it's around numbers, or I'll randomly start counting for no reason\"). His depression-related symptoms (feeling sad, lack of interest, and passive suicidal ideation) began in 2022, triggered by learning about his mother's " "past and current mental health difficulties, including a prior suicide attempt and a family intervention, as well as a relationship breakup. 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 It is noted that symptoms appear more linked to situational stressors than a more pervasive biopsychosocial pattern of depression. He endorsed some anxiety symptoms starting around age 7-8, triggered by his parents' divorce (\"staying awake and hoping that my mom was okay\"), and now centered on concerns about his career, relationships, and occasionally his safety while driving. He also has a history of restrictive eating behaviors, losing 65 lbs between 2019 and 2020, though he denied any current restriction. Patient participated in psychotherapy at [facility] between the ages of 13-15.     The patient denied a history of manic symptoms, social anxiety, phobic responses, trauma, and perceptual difficulties. The patient denied issues with sexual compulsivity and gambling.    Developmental History: The patient reported that he was born following a full-term pregnancy and there were no complications during the pregnancy or delivery. He reported that he met all of his developmental milestones on time. He grew up in [place], [place], as the first born of two children, with two step-siblings on his father's side. His parents  when he was [age] years old. His father remarried to a new partner when the patient was [age],  that partner when patient was [age], and later remarried the same partner when the patient was 21. His mother was engaged in 2009. He reported difficulties with childhood peer relationships (\"I didn't belong, didn't fit in\"). He described his childhood as \"I was a kid navigating struggles by himself.\"     The patient denied a history of learning disorders, special education programming, tutoring services, and head " "injuries.     Chemical Dependence/Substance Abuse History: The patient reported currently using alcohol (1-2 bottles of wine a week before January 2025; last used on 2/2/25 ~ 3 drinks). He reported participating in [place] and described his substance use as \"episodic,\" noting that it feels like a binge behavior and that he tends to use alcohol when he is feeling sad. Patient reported a history of unsafe sex practices associated with his alcohol use. He also reported currently using cannabis (one gummy or THC drink every 2 weeks); last used on 1/21/25.     SOURCES OF DATA/ASSESSMENT: Review of medical and psychiatric records, consideration of behavioral observations during the testing (if applicable), and the results of the psychological tests are all considered in the preparation of this psychological test report. It is important to note that test results comprise a hypothesis of the patient's mental health concerns and are not an independent or conclusive assessment. Test results are combined with the patient's available medical, psychological, behavioral data for an integrated interpretation and report. Due to virtual/remote administration, certain aspects of the assessment process were impacted, such as access to direct patient observation, and maintaining an environment conducive to testing. As such, external factors have the potential to affect the validity of data collected.    TESTS ADMINISTERED:  Jose Adult ADHD Rating Scale-IV: Self and Other Reports (BAARS-IV)  Jose Functional Impairment Scale: Self and Other Reports (BFIS)  Jose Deficits in Executive Functioning Scale (BDEFS)  Generalized Anxiety Disorder Questionnaire (VANITA-7)  Patient Health Questionnaire- 9 (PHQ-9)  Obsessive-Compulsive Inventory- Revised (OCI-R)  [place] Multiphasic Personality Inventory - Third Edition (MMPI - 3)     BEHAVIORAL OBSERVATIONS: The patient was pleasant and cooperative throughout all interview and " "explanation of testing process. He was oriented to person, place, and time. Mood was neutral. Eye contact was adequate and speech was at normal rate and rhythm. Motor activity was appropriate. Due to virtual/remote administration, direct patient observation was not possible during the testing process, and it is unknown if the patient was able to maintain an environment conducive to testing. As such, external factors have the potential to affect the validity of data collected.     TEST RESULTS: Test results comprise a hypothesis of the patient's mental health concerns and are not an independent or conclusive assessment. Test results are combined with the patient's available medical, psychological, behavioral, and observational data for an integrated interpretation and report.    Jose Adult ADHD Rating Scale-IV: Self and Other Reports (BAARS-IV)  The BAARS-IV assesses for symptoms of ADHD that are experienced in one's daily life. This assessment measure includes self and collateral rating scales designed to provide information regarding current and childhood symptoms of ADHD including inattention, hyperactivity, and impulsivity. Self-report scores are reported as percentiles. Scores at the 76th-83rd percentile are considered marginal, scores at the 84th-92nd percentile are considered borderline, scores at the 93rd-95th percentile are considered mild, scores at the 96th-98th percentile are considered moderate, and those at the 99th percentile are considered severe. Collateral or \"other\" rating scales are reported as number of symptoms observed in comparison to those reported by the patient. Norms and percentile scores are not available for collateral reports.     Current Symptoms Scale--Self Report:   Patient completed the self-report inventory of current symptoms. The results indicate that the patient's Total ADHD Score was 37 which places him in the 91 percentile for overall ADHD symptoms. In addition, the patient " endorsed 3/9 (93 percentile) Inattention symptoms, 1/9 (79 percentile) Hyperactivity-Impulsivity symptoms, and 1/9 (51-75 percentile) Sluggish Cognitive Tempo symptoms. Patient indicated that the reported symptoms have resulted in impaired functioning in school and work. Overall, the results suggest the patient is experiencing borderline ADHD symptoms.    Current Symptoms Scale--Other Report:  Patient's mother completed the collateral report inventory of current symptoms. Based on the collateral contact's observation of symptoms, the patient demonstrates 2/9 Inattention symptoms, 1/5 Hyperactivity symptoms, 0/4 Impulsivity symptoms, and 1/9 Sluggish Cognitive Tempo symptoms. The patient's Total ADHD Score was 28. The collateral contact indicated the patient demonstrates impaired functioning in school, home, and social relationships.  The collateral- and self-report scores are generally consistent, indicating minimal symptoms of both inattention and hyperactivity-impulsivity.    Childhood Symptoms Scale--Self-Report:  Patient completed the self-report inventory of childhood symptoms. The results indicate that the patient's Total ADHD Score was 21 which places him in the <50 percentile for overall ADHD symptoms in childhood. In addition, the patient endorsed 0/9 (<50 percentile) Inattention symptoms and 0/9 (<50 percentile) Hyperactivity-Impulsivity symptoms. Patient indicated that the reported symptoms resulted in impaired functioning in school, home, and social relationships. Overall, the results suggest the patient did not experience symptoms of ADHD as a child.    Childhood Symptoms Scale--Other Report:  Patient's mother completed the collateral report inventory of childhood symptoms. Based on the collateral contact's recollection of patient's childhood symptoms, the patient demonstrated 3/9 Inattention symptoms and 3/9 Hyperactivity-Impulsivity symptoms. The patient's Total ADHD Score was 40. The collateral  "contact indicated the patient demonstrated impaired functioning in school and home. The collateral- and self-report scores are different. The collateral contact's scores were higher than the patient's self-report, suggesting that more inattention and hyperactivity-impulsivity symptoms were observed during childhood than the patient acknowledged; however, these symptoms remain subclinical.                           Jose Functional Impairment Scale: Self and Other Reports (BFIS)  The BFIS is used to assess an individual's psychosocial impairment in major life/daily activities that may be due to a mental health disorder. This assessment measure includes self and collateral rating scales. Self-report scores are reported as percentiles. Scores at the 76th-83rd percentile are considered marginal, scores at the 84th-92nd percentile are considered borderline, scores at the 93rd-95th percentile are considered mild, scores at the 96th-98th percentile are considered moderate, and those at the 99th percentile are considered severe. Collateral or \"other\" rating scales are reported as number of symptoms observed in comparison to those reported by the patient. Norms and percentile scores are not available for collateral reports.     Results indicate the patient identified impairment (scores at or greater than 93rd percentile) in the following areas: driving and daily responsibilities. The patient's Mean Impairment Score was 3.8 (75-84 percentile) indicating the patient is reporting impairment in 14% percent of domains assessed. Patient's mother completed the collateral rating scale, which indicated discrepant results. The collateral contact's scores were lower than the patient's report, indicating even less functional impairment.     Jose Deficits in Executive Functioning Scale (BDEFS)  The BDEFS is a measure used for evaluating dimensions of adult executive functioning in daily life. This assessment measure includes self " "and collateral rating scales. Self-report scores are reported as percentiles. Scores at the 76th-83rd percentile are considered marginal, scores at the 84th-92nd percentile are considered borderline, scores at the 93rd-95th percentile are considered mild, scores at the 96th-98th percentile are considered moderate, and those at the 99th percentile are considered severe. Collateral or \"other\" rating scales are reported as number of symptoms observed in comparison to those reported by the patient. Norms and percentile scores are not available for collateral reports.     Results indicate the patient's Total Executive Functioning Score was 192 (88 percentile). The ADHD-Executive Functioning Index score was 23 (86 percentile). These scores suggest the patient has borderline deficits in executive functioning. The patient identified deficits in the following areas: self-management to time (borderline deficits), self-organization/problem-solving (moderate deficits), and self-restraint (borderline deficits). Patient's mother completed the collateral rating scale, which indicated similar results.    Generalized Anxiety Disorder Questionnaire (VANITA-7)  This self-report questionnaire is designed to assess for anxiety in adults. Based on the score (= 7), the patient is experiencing mild symptoms of anxiety. Patient identified the following symptoms of anxiety: feeling on edge/nervous/anxious, difficulty controlling worry, worrying about many different things, trouble relaxing, being restless, becoming easily annoyed or irritable, and feeling something awful might happen.    Patient Health Questionnaire- 9 (PHQ-9)   This self-report questionnaire is designed to assess for depression in adults. Based on the score (= 2), the patient is experiencing minimal symptoms of depression. Patient identified the following symptoms of depression: difficulty with sleep and poor concentration.    Obsessive-Compulsive Inventory- Revised " (OCI-R):  OCI-R is an 18-item self-report questionnaire measuring OCD symptoms across 6 subscales, including washing, checking, neutralizing, obsessing, ordering, and hoarding. The scale is suitable for use with adults and adolescents (16 years +). The total score for this assessment ranges from 0-60, with higher scores indicating more severe OCD symptoms.     The patient's score on the OCI-R reached clinical significance for Obsessive Compulsive Disorder (Raw Score = 36). Specifically, the patient endorsed clinically significant levels within the Checking (Raw Score = 12), Neutralizing (Raw Score = 12), and Obsessing (Raw Score = 7) subscales, indicating difficulties with excessive checking behaviors, strong feelings towards certain numbers, and distress caused by intrusive or unpleasant thoughts.     [place] Multiphasic Personality Inventory - 3 (MMPI-3)    The patient completed the MMPI-3, a self-report personality inventory, to evaluate symptoms related to emotions, cognition, behaviors, and personality features. The validity profile suggests that the patient responded in a generally straightforward and consistent manner. However, he endorsed a somewhat high number of uncommon responses, including an unusual combination of symptoms associated with memory complaints, even when compared to normative data from individuals with mental health concerns. This response pattern may be credible as a reflection of genuine and substantial psychological difficulties, but it may also reflect an exaggerated response style as a way of communicating distress. As a result, this consideration should be kept in mind in the following interpretation of this instrument, and the Cognitive Complaint Scale should be interpreted with caution. It is noted that no items were omitted.     Somatic/Cognitive Dysfunction: The patient endorsed items indicating he is experiencing poor health and feelings of weakness. Individuals with similar  profiles often report a diffuse pattern of cognitive difficulties, including memory problems, concentration challenges, and confusion.    Emotional Dysfunction: The patient reported experiencing an above-average level of stress. He reported anxiety, including worries about misfortunes, finances, and a preoccupation with disappointment. Individuals with a similar profile may exhibit passivity, indecisiveness, and irritability. He also endorsed items indicating the presence of compulsive behaviors, which may serve as a coping mechanism for managing emotional distress.    Thought Dysfunction: The patient endorsed items indicating he is experiencing unusual thoughts and perceptions. These may be related to intrusive and distressing thoughts typical of obsessive-compulsive tendencies, reflecting his recognition of unwanted, repetitive thoughts and images that cause him distress.    Behavioral Dysfunction: The patient's responses are consistent with episodes of engaging in problematic impulsive behavior. He may also experience episodes of elevated mood and heightened excitability. Individuals with similar profiles often have a history of or are currently involved in substance abuse, and they may exhibit violent or physically aggressive behavior.    Interpersonal Functioning: The patient's response pattern indicated that he balances assertiveness and warmth in his close relationships.    SUMMARY: Henry Aguilar is a 24-year-old White man who completed psychological testing remotely/virtually. Testing was requested to assess for ADHD and provide updated diagnostic clarification and treatment recommendations. Patient first completed a diagnostic interview to evaluate mental health and ADHD symptoms. During the interview, the patient self-reported seven symptoms of inattention, noting significant impairments in his ability to function effectively at school and work. Patient's self-reported symptoms on the Jose ADHD measures  were inconsistent with this information, as he endorsed only three inattentive symptoms. He also reported borderline-moderate difficulties with executive functioning, particularly in time management, organization/problem-solving, and self-restraint. His mother also did not endorse significant inattention or executive functioning concerns. Both the patient and his mother denied any history of significant ADHD symptoms during childhood, which is consistent with the patient's report that symptoms began in his late teens. This timeline of symptom onset does not suggest a neurodevelopmental explanation for inattentive symptoms.    Personality inventory results and interview findings suggest that the patient is experiencing significant emotional distress, including OCD and substance use difficulties, both of which are likely contributing to his attention challenges. On the OCI-R, he endorsed intrusive and unpleasant thoughts, as well as compulsive checking behaviors, which are likely impacting his ability to focus and complete tasks. These repetitive thoughts can consume cognitive resources, leaving less mental energy for daily responsibilities. Additionally, substance use can impair cognitive functioning and contribute to symptoms that may resemble ADHD, such as increased hyperactivity and impulsivity. Substance use disrupts executive functioning, making it more difficult to prioritize long-term goals, regulate impulses, and sustain attention. It can also cause fluctuations in energy levels, with periods of heightened activity or euphoria followed by crashes, fatigue, or apathy, further interfering with focus and follow-through. Taken together, these factors suggest that the patient's cognitive difficulties are more likely a result of OCD and substance use rather than ADHD. Therefore, targeted interventions, including medication, therapy, and lifestyle modifications, are recommended to address these underlying mental  health concerns. As OCD symptoms and substance use improve, it is expected that concentration and task completion will also improve. Based on the clinical interview, test results, and lack of collateral evidence for symptoms in childhood or currently, the patient's difficulties do not appear to have a neurodevelopmental basis and are not consistent with an ADHD diagnosis. Test results are consistent with diagnosis of Obsessive-Compulsive Disorder, With good or fair insight. See recommendations below.     Referral Question Response: DSM-5-TR criteria for ADHD:   A. Symptom Count - Are there sufficient symptoms for the diagnosis? No, patient did not endorse sufficient inattention symptoms on standardized self-report questionnaire.   B. Onset - Were several symptoms present before 12 years of age? No, the patient and his mother denied significant ADHD symptoms in childhood. He reported symptoms began in his late teens.   C. Pervasiveness - Are several symptoms present in at least two settings? Yes, patient reported that symptoms are problematic at school and work.  D. Impairment - Do symptoms interfere with or reduce the quality of functioning? Yes, patient is unable to complete daily tasks.  E. Exclusions - Are symptoms better explained by another disorder or factor? Yes, symptoms are better explained by OCD and substance use. Testing results indicate that difficulties are not explained by an organic basis of inattention.     The patient meets the following DSM-5-TR criteria for Obsessive-Compulsive Disorder, With good or fair insight:  Presence of obsessions, compulsions, or both:  Obsessions are defined by (1) and (2):   1. Recurrent and persistent thoughts, urges or images that are experienced, at some time during the disturbance, as intrusive, unwanted, and that in most individuals cause marked anxiety or distress.  2. The individual attempts to ignore or suppress such thoughts, urges, or images, or to neutralize  them with some thought or action (i.e., by performing a compulsion).  Compulsions are defined by (1) and (2):  1. Repetitive behaviors (e.g., hand washing, ordering, checking) or mental acts (e.g., praying, counting, repeating words silently) that the individual feels driven to perform in response to an obsession or according to rules that must be applied rigidly.  2.The behaviors or mental acts are aimed at preventing or reducing anxiety or distress, or preventing some dreaded event or situation; however, these behaviors or mental acts are not connected in a realistic way with what they are designed to neutralize or prevent, or are clearly excessive.  B. The obsessions or compulsions are time-consuming (e.g., take more than 1 hour per day) or cause clinically significant distress or impairment in social, occupational, or other important areas of functioning.  C. The obsessive-compulsive symptoms are not attributable to the physiological effects of a substance (e.g., a drug of abuse, a medication) or another medical condition.  D. The disturbance is not better explained by the symptoms of another mental disorder.  - With good or fair insight: The individual recognizes that obsessive-compulsive disorder beliefs are definitely or probably not true or that they may or may not be true.    DIAGNOSES:  F42.2 Obsessive-Compulsive Disorder, With good or fair insight   Rule Out: Other Substance-Related Disorder (Further assessment is recommended)    PLAN OF CARE:  Discuss the following with patient's primary care provider:  Consult with PCP about medication options to address OCD.  Discuss implementing an Omega 3 supplement. Omega 3 has been shown to improve attention, cognitive clarity, and mood.    Consider re-establishing individual psychotherapy to help improve OCD and substance use difficulties. Research indicates that outcomes are best with both medication and therapy. Patient can call the [facility] Behavioral  Access line at [phone]. 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 Recommended therapy referrals include [facility] and [facility] [place]. The therapist may want to consider the following areas of focus for treatment:  Teach practical skills to manage OCD, along with specific steps for implementing those goals and skills.  Discuss the concept of  emotional avoidance  and, more specifically, how procrastination can be an example of emotional avoidance. Teaching patient first about avoidance coping is important before time management strategies are implemented.  Cognitive Behavioral Therapy and Exposure and Response Prevention are particularly recommended for the treatment of OCD.   Treating Your OCD with Exposure and Response (Ritual) Prevention Therapy: Workbook (Treatments That Work)  by Heaven Tobin is recommended for the patient to read and practice techniques from the book with a therapist, as well as at home.  CBT, DBT, and MI are particularly recommended for the treatment of substance use.    Concerning patient's substance use, recommend sobriety from all substances, including cannabis, alcohol, nicotine, and caffeine, for three months. This is a time in neural healing where the patient can maximize his brain's ability to recover from exposure to substances. The patient could frame his sobriety as a  Dry March  challenge, so it feels more rewarding for him to discontinue using substances. Additionally, this time will allow him to process how he feels and to develop resiliency. If patient finds it difficult to obtain sobriety and substance use remains problematic, recommend formal JOHNNIE evaluation/treatment.    RECOMMENDATIONS:  Due to reported attention and concentration difficulties, the following health/lifestyle changes when combined, can significantly improve symptoms:   Avoid simple carbohydrates at breakfast. Aim for only complex  carbohydrates and lean protein for patient's morning meal.   Engage in aerobic exercise 3 times per week for 30 minutes, ensuring that patient's heart rate stays within his training zone. Further, reading the book,  Matt,  by Michael Leonardo M.D can help the patient understand the benefits of exercise on the brain.   Research suggests that taking a high-quality multi-vitamin and antioxidant (1/2 cup of blueberries) daily in conjunction with balanced nutrition can be helpful.  Aim for the high end of daily water intake: around 72 ounces per day.  Ensure regular meals and snacks to maintain optimal attention.    The following may be beneficial in managing attention and concentration difficulties:  Consider working in a completely distraction-free area while completing tasks. Workspaces should be completely clear except for the materials needed for the current task. Both visual and auditory distractions should be decreased as much as possible.  Take frequent breaks while completing tasks. This will help to maintain attention and effort. The patient may benefit from the use of a [employer] Timer. The timer works by using built-in break times. After working on a task consistently for 25 minutes, the timer reminds the user to take a five-minute break before continuing, etc. A [employer] timer can be downloaded as a free aníbal to a phone or tablet.  Increase organization with the use of lists and calendars. Significantly increasing structure to the day and adhering to a set schedule can increase patient's ability to complete responsibilities, track deadlines, etc. Breaking these tasks down into their component parts and recording them in a calendar/planner will likely be beneficial. Patient would benefit from setting feasible timelines for completion of activities. By establishing clear priorities for completing tasks, patient can more likely complete the most important tasks first. The patient may also choose to elect to a friend  or family member to help hold them accountable.  Consider moving tempting apps to the third or fourth page of patient's phone screen to have time to catch himself mindlessly opening them.  Consider turning off unnecessary websites or apps that are not necessary for the task at hand.    Avoid multitasking. Attempting to work on multiple tasks and projects the same increases the likelihood that an error will occur. Focus on one task at a time.    Considering patient's difficulties with attention, he may want to consider using the following educational programs in PA school:  Mind mapping can be an effective method for organizing and absorbing information. For example, the patient can download XMind for free to help generate all the ideas the patient brainstorms. Mind maps can also help the patient think deeper, explore different creative pathways, and stay organized by managing thoughts.  [employer] allows articles, PDFs, and audio books to be read aloud to the patient while also highlighting each word as it is read. For more information, please refer to the following website: [email or web address].  [employer] Audio Notetaker is an application that combines audio, visual, and text notes in one space. It can make it easier for patient to record class without missing material, capture all the information, and summarize it later to take further detailed notes ([email or web address]).    Invest in a smartpen (e.g., [employer]) that will allow patient to take notes at school that can be downloaded to the patient's computer and let him record any school lecture and listen to them later on the computer.    The patient may benefit from engaging in mindfulness practices. This may include participating in an eight-week Mindfulness-Based Stress Reduction (MBSR) Program.  The form of therapy was created by Michael Resendiz in the late 1970s to help patients see their physical pain objectively and then learn how to  handle pain while suffering less. He removed the Catholic framework and added a more scientific approach that blends mediation, body awareness, and yoga. It is a free online training course lead by a certified MBSR instructor. The course includes guided meditations, readings, and practices to help build knowledge and experience in how patient's body handles and resolves stress neurologically. [email or web address]://[email or web address].html   Mindfulness may help the patient focus on being in the present moment (i.e., paying attention to his emotions, surroundings, body, and thoughts).    Ashley Long PsyD, Postdoctoral Fellow    Clinical supervision and documentation review provided by Cyndie Cole, PhD LP  3/6/2025

## 2025-03-06 NOTE — Clinical Note
Hello,  I wanted to inform you that I have completed the ADHD assessment for this patient. As outlined in the report, the data does not support an ADHD diagnosis. Instead, the results indicate Obsessive-Compulsive Disorder with good or fair insight. Additionally, I recommended further evaluation for a substance-related disorder if use remains problematic. I encouraged him to schedule an appointment with you soon to discuss medication options. Please let me know if you have any questions or concerns!  Jay,  Ashley Long PsyD, Postdoctoral Fellow

## 2025-03-06 NOTE — PROGRESS NOTES
[facility]   Mental Health & Addiction Services     Progress Note - ADHD Feedback Session     Patient Name: Henry Aguilar  Date: March 6, 2025       Service Type:  Individual       Session Start Time: 8:00 AM  Session End Time:  8:40 AM     Session Length: 40 minutes    Session #: 3    Attendees: Patient attended alone    Service Modality: Video Visit:      Provider verified identity through the following two step process.  Patient provided:  Patient is known previously to provider    Telemedicine Visit: The patient's condition can be safely assessed and treated via synchronous audio and visual telemedicine encounter.      Reason for Telemedicine Visit: Patient convenience (e.g. access to timely appointments / distance to available provider)    Originating Site (Patient Location): Patient's home    Distant Site (Provider Location): Provider Remote Setting- Home Office    Consent:  The patient/guardian has verbally consented to: the potential risks and benefits of telemedicine (video visit) versus in person care; bill my insurance or make self-payment for services provided; and responsibility for payment of non-covered services.     Patient would like the video invitation sent by:  Send to e-mail at: [email or web address]    Mode of Communication:  Video Conference via Amwell    Distant Location (Provider):  Off-site    As the provider I attest to compliance with applicable laws and regulations related to telemedicine.        2/12/2025     7:27 AM 3/6/2025     7:04 AM   PHQ   PHQ-9 Total Score 4  1    Q9: Thoughts of better off dead/self-harm past 2 weeks Not at all Not at all          2/12/2025     7:27 AM 3/6/2025     7:05 AM   VANITA-7 SCORE   Total Score 7 (mild anxiety) 7 (mild anxiety)   Total Score 7  7       DATA      Progress Since Last Session (Related to Symptoms / Goals / Homework):   Symptoms: Stable.    Homework: Completed.      Treatment  Objective(s) Addressed in This Session:   Provided feedback on ADHD evaluation. Reviewed test results in depth. Plan of care and recommendations were discussed based on testing data. See full report attached on secondary note in this encounter.     Intervention:   Provided feedback to patient regarding testing results, diagnoses, and treatment recommendations. Test results are not consistent with an ADHD diagnosis. Symptoms are better explained by OCD. Personalized suggestions regarding symptoms were offered. Patient had the opportunity to ask questions; he expressed understanding.        ASSESSMENT: Current Emotional / Mental Status (status of significant symptoms):   Risk status (Self / Other harm or suicidal ideation)   Patient denies current fears or concerns for personal safety.   Patient denies current or recent suicidal ideation or behaviors.   Patient denies current or recent homicidal ideation or behaviors.   Patient denies current or recent self injurious behavior or ideation.   Patient denies other safety concerns.   Patient reports there has been no change in risk factors since his last session.     Patient reports there has been no change in protective factors since his last session.     Recommended that patient call 911 or go to the local ED should there be a change in any of these risk factors     Appearance:   Appropriate    Eye Contact:   Good    Psychomotor Behavior: Normal    Attitude:   Cooperative  Friendly Pleasant   Orientation:   All   Speech    Rate / Production: Normal/ Responsive    Volume:  Normal    Mood:    Normal   Affect:    Appropriate    Thought Content:  Clear    Thought Form:  Coherent  Logical    Insight:    Good      Medication Review:   No current psychiatric medications prescribed     Medication Compliance:   NA     Changes in Health Issues:   None reported     Chemical Use Review:   Substance Use: Problem use continues with no change since last session (drinking 3-4 days a  week).     Nicotine Use: No current tobacco use.      Diagnosis:  Obsessive-Compulsive Disorder, With good or fair insight   Rule Out: Other Substance-Related Disorder (Further assessment is recommended)      PLAN:   Recommendations are outlined in full evaluation report (attached to this encounter).   Patient indicated understanding and will contact the clinic if there are further questions.    Report routed to referring provider.    Parts of this documentation may have been completed using dictation software. Potential errors may result and are unintentional.       Ashley Long PsyD, Postdoctoral Fellow    Clinical supervision and documentation review provided by Cyndie Cole, PhD MICKY  3/6/2025     Psychological Testing Services Summary       Testing Evaluation Services Base: 19369  (first 60 mins) Add-on: 24221  (each addtl 60 mins)   Record Review and Clarify Referral Question   8:50am-9:00am on 2/5/25 10 minutes   Clinical Decision Making/Battery Modification   7:55am-8:10am on 2/12/25 15 minutes   Integration/Report Generation   7:00am-8:00am on 2/24/25 ([place])  10:30am-11:10am on 2/24/25 (MMPI-3)  2:00pm-3:00pm on 3/3/25 (Final Report)   60 minutes  40 minutes  60 minutes   Interactive Feedback Session   8:00am-8:40am on 3/6/25 40 minutes   Post-Service Work   8:40am-8:55am on 3/6/25 15 minutes   Total Time: 240 minutes   Total Units: 1 3       Diagnoses:   F42.2 Obsessive-Compulsive Disorder, With good or fair insight   Rule Out: Other Substance-Related Disorder (Further assessment is recommended)

## 2025-05-19 ENCOUNTER — OFFICE VISIT (OUTPATIENT)
Dept: FAMILY MEDICINE | Facility: CLINIC | Age: 25
End: 2025-05-19
Attending: FAMILY MEDICINE
Payer: COMMERCIAL

## 2025-05-19 VITALS
RESPIRATION RATE: 16 BRPM | HEART RATE: 77 BPM | TEMPERATURE: 98 F | BODY MASS INDEX: 23.32 KG/M2 | OXYGEN SATURATION: 100 % | HEIGHT: 65 IN | DIASTOLIC BLOOD PRESSURE: 78 MMHG | WEIGHT: 140 LBS | SYSTOLIC BLOOD PRESSURE: 122 MMHG

## 2025-05-19 DIAGNOSIS — F42.9 OBSESSIVE-COMPULSIVE DISORDER, UNSPECIFIED TYPE: ICD-10-CM

## 2025-05-19 DIAGNOSIS — Z29.81 ENCOUNTER FOR COUNSELING BEFORE STARTING AND ABOUT PRE-EXPOSURE PROPHYLAXIS FOR HIV: ICD-10-CM

## 2025-05-19 DIAGNOSIS — Z00.00 ROUTINE GENERAL MEDICAL EXAMINATION AT A HEALTH CARE FACILITY: Primary | ICD-10-CM

## 2025-05-19 DIAGNOSIS — Z71.89 ENCOUNTER FOR COUNSELING BEFORE STARTING AND ABOUT PRE-EXPOSURE PROPHYLAXIS FOR HIV: ICD-10-CM

## 2025-05-19 DIAGNOSIS — Z13.220 SCREENING CHOLESTEROL LEVEL: ICD-10-CM

## 2025-05-19 LAB
ALBUMIN SERPL BCG-MCNC: 4.4 G/DL (ref 3.5–5.2)
ALP SERPL-CCNC: 90 U/L (ref 40–150)
ALT SERPL W P-5'-P-CCNC: 29 U/L (ref 0–70)
ANION GAP SERPL CALCULATED.3IONS-SCNC: 10 MMOL/L (ref 7–15)
AST SERPL W P-5'-P-CCNC: 27 U/L (ref 0–45)
BILIRUB SERPL-MCNC: 0.5 MG/DL
BUN SERPL-MCNC: 19.7 MG/DL (ref 6–20)
CALCIUM SERPL-MCNC: 9.5 MG/DL (ref 8.8–10.4)
CHLORIDE SERPL-SCNC: 104 MMOL/L (ref 98–107)
CHOLEST SERPL-MCNC: 161 MG/DL
CREAT SERPL-MCNC: 1.06 MG/DL (ref 0.67–1.17)
EGFRCR SERPLBLD CKD-EPI 2021: >90 ML/MIN/1.73M2
ERYTHROCYTE [DISTWIDTH] IN BLOOD BY AUTOMATED COUNT: 12.3 % (ref 10–15)
FASTING STATUS PATIENT QL REPORTED: YES
FASTING STATUS PATIENT QL REPORTED: YES
GLUCOSE SERPL-MCNC: 92 MG/DL (ref 70–99)
HCO3 SERPL-SCNC: 26 MMOL/L (ref 22–29)
HCT VFR BLD AUTO: 45.2 % (ref 40–53)
HDLC SERPL-MCNC: 67 MG/DL
HGB BLD-MCNC: 15.2 G/DL (ref 13.3–17.7)
HIV 1+2 AB+HIV1 P24 AG SERPL QL IA: NONREACTIVE
LDLC SERPL CALC-MCNC: 82 MG/DL
MCH RBC QN AUTO: 31.2 PG (ref 26.5–33)
MCHC RBC AUTO-ENTMCNC: 33.6 G/DL (ref 31.5–36.5)
MCV RBC AUTO: 93 FL (ref 78–100)
NONHDLC SERPL-MCNC: 94 MG/DL
PLATELET # BLD AUTO: 231 10E3/UL (ref 150–450)
POTASSIUM SERPL-SCNC: 4.7 MMOL/L (ref 3.4–5.3)
PROT SERPL-MCNC: 7 G/DL (ref 6.4–8.3)
RBC # BLD AUTO: 4.87 10E6/UL (ref 4.4–5.9)
SODIUM SERPL-SCNC: 140 MMOL/L (ref 135–145)
TRIGL SERPL-MCNC: 60 MG/DL
WBC # BLD AUTO: 5.3 10E3/UL (ref 4–11)

## 2025-05-19 PROCEDURE — 36415 COLL VENOUS BLD VENIPUNCTURE: CPT | Performed by: FAMILY MEDICINE

## 2025-05-19 PROCEDURE — 80061 LIPID PANEL: CPT | Performed by: FAMILY MEDICINE

## 2025-05-19 PROCEDURE — 80053 COMPREHEN METABOLIC PANEL: CPT | Performed by: FAMILY MEDICINE

## 2025-05-19 PROCEDURE — 87389 HIV-1 AG W/HIV-1&-2 AB AG IA: CPT | Performed by: FAMILY MEDICINE

## 2025-05-19 PROCEDURE — 99395 PREV VISIT EST AGE 18-39: CPT | Performed by: FAMILY MEDICINE

## 2025-05-19 PROCEDURE — 85027 COMPLETE CBC AUTOMATED: CPT | Performed by: FAMILY MEDICINE

## 2025-05-19 RX ORDER — EMTRICITABINE AND TENOFOVIR DISOPROXIL FUMARATE 200; 300 MG/1; MG/1
TABLET, FILM COATED ORAL
Qty: 30 TABLET | Refills: 2 | Status: SHIPPED | OUTPATIENT
Start: 2025-05-19

## 2025-05-19 SDOH — HEALTH STABILITY: PHYSICAL HEALTH: ON AVERAGE, HOW MANY DAYS PER WEEK DO YOU ENGAGE IN MODERATE TO STRENUOUS EXERCISE (LIKE A BRISK WALK)?: 5 DAYS

## 2025-05-19 ASSESSMENT — SOCIAL DETERMINANTS OF HEALTH (SDOH): HOW OFTEN DO YOU GET TOGETHER WITH FRIENDS OR RELATIVES?: ONCE A WEEK

## 2025-05-19 NOTE — PROGRESS NOTES
Preventive Care Visit  [facility]  Susie Hector Carvalho MD, Family Medicine  May 19, 2025      Assessment & Plan     Routine general medical examination at a health care facility  Healthy overall.  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  Will maintain a relationship and he can send me messages anytime.    Obsessive-compulsive disorder, unspecified type  Reviewed interval history including psychology testing.  Symptoms more consistent with OCD than ADHD.  Patient does feel that this fits.  So we talked about how to manage this and we will start with some SSRI treatment.  - FLUoxetine (PROZAC) 20 MG capsule; Take 1 capsule (20 mg) by mouth daily.    Encounter for counseling before starting and about pre-exposure prophylaxis for HIV    - emtricitabine-tenofovir (TRUVADA) 200-300 MG per tablet; 2 tabs prior to sexual activity, then 1 tab daily x 2 days.  - Comprehensive metabolic panel; Future  - CBC with platelets; Future  - HIV Antigen Antibody Combo Cascade; Future    Screening cholesterol level    - Lipid panel reflex to direct LDL Non-fasting; Future    Patient has been advised of split billing requirements and indicates understanding: Yes        Counseling  Appropriate preventive services were addressed with this patient via screening, questionnaire, or discussion as appropriate for fall prevention, nutrition, physical activity, Tobacco-use cessation, social engagement, weight loss and cognition.  Checklist reviewing preventive services available has been given to the patient.  Reviewed patient's diet, addressing concerns and/or questions.   He is at risk for psychosocial distress and has been provided with information to reduce risk.           Ilene Figueroa is a 24 year old, presenting for the following:  Physical        5/19/2025     9:58 AM   Additional Questions   Roomed by [facility]  Here today for routine checkup         Advance Care Planning             5/19/2025   General Health   How would you rate your overall physical health? Excellent   Feel stress (tense, anxious, or unable to sleep) To some extent   (!) STRESS CONCERN      5/19/2025   Nutrition   Three or more servings of calcium each day? Yes   Diet: Regular (no restrictions)   How many servings of fruit and vegetables per day? (!) 2-3   How many sweetened beverages each day? 0-1         5/19/2025   Exercise   Days per week of moderate/strenous exercise 5 days         5/19/2025   Social Factors   Frequency of gathering with friends or relatives Once a week   Worry food won't last until get money to buy more No   Food not last or not have enough money for food? No   Do you have housing? (Housing is defined as stable permanent housing and does not include staying outside in a car, in a tent, in an abandoned building, in an overnight shelter, or couch-surfing.) Yes   Are you worried about losing your housing? No   Lack of transportation? No   Unable to get utilities (heat,electricity)? No         5/19/2025   Dental   Dentist two times every year? Yes         Today's PHQ-2 Score:       5/19/2025     9:54 AM   PHQ-2 ( 1999 Pfizer)   Q1: Little interest or pleasure in doing things 0   Q2: Feeling down, depressed or hopeless 0   PHQ-2 Score 0    Q1: Little interest or pleasure in doing things Not at all   Q2: Feeling down, depressed or hopeless Not at all   PHQ-2 Score 0       Patient-reported           5/19/2025   Substance Use   Alcohol more than 3/day or more than 7/wk No   Do you use any other substances recreationally? (!) CANNABIS PRODUCTS     Social History     Tobacco Use    Smoking status: Never     Passive exposure: Never    Smokeless tobacco: Never   Substance Use Topics    Alcohol use: Yes     Comment: occasional    Drug use: Never           5/19/2025   STI Screening   New sexual partner(s) since last STI/HIV test? (!) YES          5/19/2025   Contraception/Family Planning   Questions about  "contraception or family planning No        Reviewed and updated as needed this visit by Provider   Tobacco  Allergies  Meds  Problems  Med Hx  Surg Hx  Fam Hx            Lab work is in process  Labs reviewed in EPIC  BP Readings from Last 3 Encounters:   05/19/25 122/78   05/15/24 128/81   01/31/23 120/76    Wt Readings from Last 3 Encounters:   05/19/25 63.5 kg (140 lb)   05/15/24 62.6 kg (138 lb)   01/31/23 57.7 kg (127 lb 3.2 oz)                      Review of Systems  CONSTITUTIONAL: NEGATIVE for fever, chills, change in weight  INTEGUMENTARY/SKIN: NEGATIVE for worrisome rashes, moles or lesions  EYES: NEGATIVE for vision changes or irritation  ENT/MOUTH: NEGATIVE for ear, mouth and throat problems  RESP: NEGATIVE for significant cough or SOB  BREAST: NEGATIVE for masses, tenderness or discharge  CV: NEGATIVE for chest pain, palpitations or peripheral edema  GI: NEGATIVE for nausea, abdominal pain, heartburn, or change in bowel habits  : NEGATIVE for frequency, dysuria, or hematuria  MUSCULOSKELETAL: NEGATIVE for significant arthralgias or myalgia  NEURO: NEGATIVE for weakness, dizziness or paresthesias  ENDOCRINE: NEGATIVE for temperature intolerance, skin/hair changes  HEME: NEGATIVE for bleeding problems  PSYCHIATRIC: NEGATIVE for changes in mood or affect     Objective    Exam  /78 (BP Location: Right arm, Patient Position: Sitting, Cuff Size: Adult Regular)   Pulse 77   Temp 98  F (36.7  C) (Oral)   Resp 16   Ht 1.66 m (5' 5.35\")   Wt 63.5 kg (140 lb)   SpO2 100%   BMI 23.05 kg/m     Estimated body mass index is 23.05 kg/m  as calculated from the following:    Height as of this encounter: 1.66 m (5' 5.35\").    Weight as of this encounter: 63.5 kg (140 lb).    Physical Exam  GENERAL: alert and no distress  EYES: Eyes grossly normal to inspection, PERRL and conjunctivae and sclerae normal  HENT: ear canals and TM's normal, nose and mouth without ulcers or lesions  NECK: no adenopathy, no " asymmetry, masses, or scars  RESP: lungs clear to auscultation - no rales, rhonchi or wheezes  CV: regular rate and rhythm, normal S1 S2, no S3 or S4, no murmur, click or rub, no peripheral edema  ABDOMEN: soft, nontender, no hepatosplenomegaly, no masses and bowel sounds normal  MS: no gross musculoskeletal defects noted, no edema  SKIN: no suspicious lesions or rashes  NEURO: Normal strength and tone, mentation intact and speech normal  PSYCH: mentation appears normal, affect normal/bright        Signed Electronically by: Susie Carvalho MD

## 2025-05-19 NOTE — PATIENT INSTRUCTIONS
Patient Education   Preventive Care Advice   This is general advice given by our system to help you stay healthy. However, your care team may have specific advice just for you. Please talk to your care team about your preventive care needs.  Nutrition  Eat 5 or more servings of fruits and vegetables each day.  Try wheat bread, brown rice and whole grain pasta (instead of white bread, rice, and pasta).  Get enough calcium and vitamin D. Check the label on foods and aim for 100% of the RDA (recommended daily allowance).  Lifestyle  Exercise at least 150 minutes each week  (30 minutes a day, 5 days a week).  Do muscle strengthening activities 2 days a week. These help control your weight and prevent disease.  No smoking.  Wear sunscreen to prevent skin cancer.  Have a dental exam and cleaning every 6 months.  Yearly exams  See your health care team every year to talk about:  Any changes in your health.  Any medicines your care team has prescribed.  Preventive care, family planning, and ways to prevent chronic diseases.  Shots (vaccines)   HPV shots (up to age 26), if you've never had them before.  Hepatitis B shots (up to age 59), if you've never had them before.  COVID-19 shot: Get this shot when it's due.  Flu shot: Get a flu shot every year.  Tetanus shot: Get a tetanus shot every 10 years.  Pneumococcal, hepatitis A, and RSV shots: Ask your care team if you need these based on your risk.  Shingles shot (for age 50 and up)  General health tests  Diabetes screening:  Starting at age 35, Get screened for diabetes at least every 3 years.  If you are younger than age 35, ask your care team if you should be screened for diabetes.  Cholesterol test: At age 39, start having a cholesterol test every 5 years, or more often if advised.  Bone density scan (DEXA): At age 50, ask your care team if you should have this scan for osteoporosis (brittle bones).  Hepatitis C: Get tested at least once in your life.  STIs (sexually  transmitted infections)  Before age 24: Ask your care team if you should be screened for STIs.  After age 24: Get screened for STIs if you're at risk. You are at risk for STIs (including HIV) if:  You are sexually active with more than one person.  You don't use condoms every time.  You or a partner was diagnosed with a sexually transmitted infection.  If you are at risk for HIV, ask about PrEP medicine to prevent HIV.  Get tested for HIV at least once in your life, whether you are at risk for HIV or not.  Cancer screening tests  Cervical cancer screening: If you have a cervix, begin getting regular cervical cancer screening tests starting at age 21.  Breast cancer scan (mammogram): If you've ever had breasts, begin having regular mammograms starting at age 40. This is a scan to check for breast cancer.  Colon cancer screening: It is important to start screening for colon cancer at age 45.  Have a colonoscopy test every 10 years (or more often if you're at risk) Or, ask your provider about stool tests like a FIT test every year or Cologuard test every 3 years.  To learn more about your testing options, visit:   .  For help making a decision, visit:   https://bit.ly/lv86474.  Prostate cancer screening test: If you have a prostate, ask your care team if a prostate cancer screening test (PSA) at age 55 is right for you.  Lung cancer screening: If you are a current or former smoker ages 50 to 80, ask your care team if ongoing lung cancer screenings are right for you.  For informational purposes only. Not to replace the advice of your health care provider. Copyright   2023 [facility]. All rights reserved. Clinically reviewed by the [facility] Transitions Program. [employer] [id number] - REV 01/24.  Learning About Stress  What is stress?     Stress is your body's response to a hard situation. Your body can have a physical, emotional, or mental response. Stress is a fact of life for most people, and it  affects everyone differently. What causes stress for you may not be stressful for someone else.  A lot of things can cause stress. You may feel stress when you go on a job interview, take a test, or run a race. This kind of short-term stress is normal and even useful. It can help you if you need to work hard or react quickly. For example, stress can help you finish an important job on time.  Long-term stress is caused by ongoing stressful situations or events. Examples of long-term stress include long-term health problems, ongoing problems at work, or conflicts in your family. Long-term stress can harm your health.  How does stress affect your health?  When you are stressed, your body responds as though you are in danger. It makes hormones that speed up your heart, make you breathe faster, and give you a burst of energy. This is called the fight-or-flight stress response. If the stress is over quickly, your body goes back to normal and no harm is done.  But if stress happens too often or lasts too long, it can have bad effects. Long-term stress can make you more likely to get sick, and it can make symptoms of some diseases worse. If you tense up when you are stressed, you may develop neck, shoulder, or low back pain. Stress is linked to high blood pressure and heart disease.  Stress also harms your emotional health. It can make you dominguez, tense, or depressed. Your relationships may suffer, and you may not do well at work or school.  What can you do to manage stress?  You can try these things to help manage stress:   Do something active. Exercise or activity can help reduce stress. Walking is a great way to get started. Even everyday activities such as housecleaning or yard work can help.  Try yoga or jaclyn chi. These techniques combine exercise and meditation. You may need some training at first to learn them.  Do something you enjoy. For example, listen to music or go to a movie. Practice your hobby or do volunteer  "work.  Meditate. This can help you relax, because you are not worrying about what happened before or what may happen in the future.  Do guided imagery. Imagine yourself in any setting that helps you feel calm. You can use online videos, books, or a teacher to guide you.  Do breathing exercises. For example:  From a standing position, bend forward from the waist with your knees slightly bent. Let your arms dangle close to the floor.  Breathe in slowly and deeply as you return to a standing position. Roll up slowly and lift your head last.  Hold your breath for just a few seconds in the standing position.  Breathe out slowly and bend forward from the waist.  Let your feelings out. Talk, laugh, cry, and express anger when you need to. Talking with supportive friends or family, a counselor, or a stephani leader about your feelings is a healthy way to relieve stress. Avoid discussing your feelings with people who make you feel worse.  Write. It may help to write about things that are bothering you. This helps you find out how much stress you feel and what is causing it. When you know this, you can find better ways to cope.  What can you do to prevent stress?  You might try some of these things to help prevent stress:  Manage your time. This helps you find time to do the things you want and need to do.  Get enough sleep. Your body recovers from the stresses of the day while you are sleeping.  Get support. Your family, friends, and community can make a difference in how you experience stress.  Limit your news feed. Avoid or limit time on social media or news that may make you feel stressed.  Do something active. Exercise or activity can help reduce stress. Walking is a great way to get started.  Where can you learn more?  Go to https://www.[employer].net/patiented  Enter N032 in the search box to learn more about \"Learning About Stress.\"  Current as of: October 24, 2024  Content Version: 14.4 2024-2025 [employer].   Care instructions adapted under license by your healthcare professional. If you have questions about a medical condition or this instruction, always ask your healthcare professional. [employer] disclaims any warranty or liability for your use of this information.    Substance Use Disorder: Care Instructions  Overview     You can improve your life and health by stopping your use of alcohol or drugs. When you don't drink or use drugs, you may feel and sleep better. You may get along better with your family, friends, and coworkers. There are medicines and programs that can help with substance use disorder.  How can you care for yourself at home?  Here are some ways to help you stay sober and prevent relapse.  If you have been given medicine to help keep you sober or reduce your cravings, be sure to take it exactly as prescribed.  Talk to your doctor about programs that can help you stop using drugs or drinking alcohol.  Do not keep alcohol or drugs in your home.  Plan ahead. Think about what you'll say if other people ask you to drink or use drugs. Try not to spend time with people who drink or use drugs.  Use the time and money spent on drinking or drugs to do something that's important to you.  Preventing a relapse  Have a plan to deal with relapse. Learn to recognize changes in your thinking that lead you to drink or use drugs. Get help before you start to drink or use drugs again.  Try to stay away from situations, friends, or places that may lead you to drink or use drugs.  If you feel the need to drink alcohol or use drugs again, seek help right away. Call a trusted friend or family member. Some people get support from organizations such as Narcotics Anonymous or [employer] or from treatment facilities.  If you relapse, get help as soon as you can. Some people make a plan with another person that outlines what they want that person to do for them if they relapse. The plan usually includes  how to handle the relapse and who to notify in case of relapse.  Don't give up. Remember that a relapse doesn't mean that you have failed. Use the experience to learn the triggers that lead you to drink or use drugs. Then quit again. Recovery is a lifelong process. Many people have several relapses before they are able to quit for good.  Follow-up care is a key part of your treatment and safety. Be sure to make and go to all appointments, and call your doctor if you are having problems. It's also a good idea to know your test results and keep a list of the medicines you take.  When should you call for help?   Call 911  anytime you think you may need emergency care. For example, call if you or someone else:    Has overdosed or has withdrawal signs. Be sure to tell the emergency workers that you are or someone else is using or trying to quit using drugs. Overdose or withdrawal signs may include:  Losing consciousness.  Seizure.  Seeing or hearing things that aren't there (hallucinations).     Is thinking or talking about suicide or harming others.   Where to get help 24 hours a day, 7 days a week   If you or someone you know talks about suicide, self-harm, a mental health crisis, a substance use crisis, or any other kind of emotional distress, get help right away. You can:    Call the Suicide and Crisis Lifeline at 837.     Call 0-710-006-TALK (1-791.884.4348).     Text HOME to 098488 to access the Crisis Text Line.   Consider saving these numbers in your phone.  Go to [employer] for more information or to chat online.  Call your doctor now or seek immediate medical care if:    You are having withdrawal symptoms. These may include nausea or vomiting, sweating, shakiness, and anxiety.   Watch closely for changes in your health, and be sure to contact your doctor if:    You have a relapse.     You need more help or support to stop.   Where can you learn more?  Go to https://www.healthwise.net/patiented  Enter H573  "in the search box to learn more about \"Substance Use Disorder: Care Instructions.\"  Current as of: August 20, 2024  Content Version: 14.4    0147-4211 [employer].   Care instructions adapted under license by your healthcare professional. If you have questions about a medical condition or this instruction, always ask your healthcare professional. [employer] disclaims any warranty or liability for your use of this information.    Safer Sex: Care Instructions  Overview  Safer sex is a way to reduce your risk of getting a sexually transmitted infection (STI). It can also help prevent pregnancy.  Several products can help you practice safer sex and reduce your chance of STIs. One of the best is a condom. There are internal and external condoms. You can use a special rubber sheet (dental dam) for protection during oral sex. Disposable gloves can keep your hands from touching blood, semen, or other body fluids that can carry infections.  Remember that birth control methods such as diaphragms, IUDs, foams, and birth control pills do not stop you from getting STIs.  Follow-up care is a key part of your treatment and safety. Be sure to make and go to all appointments, and call your doctor if you are having problems. It's also a good idea to know your test results and keep a list of the medicines you take.  How can you care for yourself at home?  Think about getting vaccinated to help prevent hepatitis A, hepatitis B, and human papillomavirus (HPV). They can be spread through sex.  Use a condom every time you have sex. Use an external condom, which goes on the penis. Or use an internal condom, which goes into the vagina or anus.  Make sure you use the right size external condom. A condom that's too small can break easily. A condom that's too big can slip off during sex.  Use a new condom each time you have sex. Be careful not to poke a hole in the condom when you open the wrapper.  Don't use an internal " "condom and an external condom at the same time.  Never use petroleum jelly (such as Vaseline), grease, hand lotion, baby oil, or anything with oil in it. These products can make holes in the condom.  After intercourse, hold the edge of the condom as you remove it. This will help keep semen from spilling out of the condom.  Do not have sex with anyone who has symptoms of an STI, such as sores on the genitals or mouth.  Do not drink a lot of alcohol or use drugs before sex.  Limit your sex partners. Sex with one partner who has sex only with you can reduce your risk of getting an STI.  Don't share sex toys. But if you do share them, use a condom and clean the sex toys between each use.  Talk to any partners before you have sex. Talk about what you feel comfortable with and whether you have any boundaries with sex. And find out if your partner or partners may be at risk for any STI. Keep in mind that a person may be able to spread an STI even if they do not have symptoms. You and any partners may want to get tested for STIs.  Where can you learn more?  Go to https://www.[employer].net/patiented  Enter B608 in the search box to learn more about \"Safer Sex: Care Instructions.\"  Current as of: April 30, 2024  Content Version: 14.4    8317-3828 [employer]   Care instructions adapted under license by your healthcare professional. If you have questions about a medical condition or this instruction, always ask your healthcare professional. [employer] disclaims any warranty or liability for your use of this information.       "

## 2025-05-20 ENCOUNTER — RESULTS FOLLOW-UP (OUTPATIENT)
Dept: FAMILY MEDICINE | Facility: CLINIC | Age: 25
End: 2025-05-20
Payer: COMMERCIAL

## 2025-05-20 NOTE — LETTER
May 20, 2025      Henry Aguilar  [address] [place] [address]        Henry,    Your lab work all looks great.  Everything is perfectly normal.  Good luck with school coming up and keep me updated anytime.    Resulted Orders   Comprehensive metabolic panel   Result Value Ref Range    Sodium 140 135 - 145 mmol/L    Potassium 4.7 3.4 - 5.3 mmol/L    Carbon Dioxide (CO2) 26 22 - 29 mmol/L    Anion Gap 10 7 - 15 mmol/L    Urea Nitrogen 19.7 6.0 - 20.0 mg/dL    Creatinine 1.06 0.67 - 1.17 mg/dL    GFR Estimate >90 >60 mL/min/1.73m2      Comment:      eGFR calculated using 2021 CKD-EPI equation.    Calcium 9.5 8.8 - 10.4 mg/dL    Chloride 104 98 - 107 mmol/L    Glucose 92 70 - 99 mg/dL    Alkaline Phosphatase 90 40 - 150 U/L    AST 27 0 - 45 U/L    ALT 29 0 - 70 U/L    Protein Total 7.0 6.4 - 8.3 g/dL    Albumin 4.4 3.5 - 5.2 g/dL    Bilirubin Total 0.5 <=1.2 mg/dL    Patient Fasting > 8hrs? Yes    CBC with platelets   Result Value Ref Range    WBC Count 5.3 4.0 - 11.0 10e3/uL    RBC Count 4.87 4.40 - 5.90 10e6/uL    Hemoglobin 15.2 13.3 - 17.7 g/dL    Hematocrit 45.2 40.0 - 53.0 %    MCV 93 78 - 100 fL    MCH 31.2 26.5 - 33.0 pg    MCHC 33.6 31.5 - 36.5 g/dL    RDW 12.3 10.0 - 15.0 %    Platelet Count 231 150 - 450 10e3/uL   Lipid panel reflex to direct LDL Non-fasting   Result Value Ref Range    Cholesterol 161 <200 mg/dL    Triglycerides 60 <150 mg/dL    Direct Measure HDL 67 >=40 mg/dL    LDL Cholesterol Calculated 82 <100 mg/dL    Non HDL Cholesterol 94 <130 mg/dL    Patient Fasting > 8hrs? Yes     Narrative    Cholesterol  Desirable: < 200 mg/dL  Borderline High: 200 - 239 mg/dL  High: >= 240 mg/dL    Triglycerides  Normal: < 150 mg/dL  Borderline High: 150 - 199 mg/dL  High: 200-499 mg/dL  Very High: >= 500 mg/dL    Direct Measure HDL  Female: >= 50 mg/dL   Male: >= 40 mg/dL    LDL Cholesterol  Desirable: < 100 mg/dL  Above Desirable: 100 - 129 mg/dL   Borderline High: 130 - 159 mg/dL   High:  160 - 189  mg/dL   Very High: >= 190 mg/dL    Non HDL Cholesterol  Desirable: < 130 mg/dL  Above Desirable: 130 - 159 mg/dL  Borderline High: 160 - 189 mg/dL  High: 190 - 219 mg/dL  Very High: >= 220 mg/dL   HIV Antigen Antibody Combo Cascade   Result Value Ref Range    HIV Antigen Antibody Combo Nonreactive Nonreactive      Comment:      Negative HIV-1 p24 antigen and HIV-1/2 antibody screening test results usually indicate the absence of HIV-1 and HIV-2 infection. However, such negative results do not rule-out acute HIV infection.  If acute HIV-1 or HIV-2 infection is suspected, detection of HIV-1 or HIV-2 RNA  is recommended. This result is obtained using the Roche Elecsys HIV Duo method on the rl [id number] immunoassay analyzer.       If you have any questions or concerns, please call the clinic at the number listed above.       Sincerely,      Susie Carvalho MD    Electronically signed

## 2025-05-20 NOTE — RESULT ENCOUNTER NOTE
Henry,  Your lab work all looks great.  Everything is perfectly normal.  Good luck with school coming up and keep me updated anytime.  JERICHO Carvalho M.D.

## 2025-05-27 ENCOUNTER — ALLIED HEALTH/NURSE VISIT (OUTPATIENT)
Dept: FAMILY MEDICINE | Facility: CLINIC | Age: 25
End: 2025-05-27
Payer: COMMERCIAL

## 2025-05-27 VITALS — TEMPERATURE: 97.4 F

## 2025-05-27 DIAGNOSIS — Z23 NEED FOR HEPATITIS B VACCINATION: Primary | ICD-10-CM

## 2025-05-27 PROCEDURE — 99207 PR NO CHARGE NURSE ONLY: CPT

## 2025-05-27 PROCEDURE — 90471 IMMUNIZATION ADMIN: CPT

## 2025-05-27 PROCEDURE — 90746 HEPB VACCINE 3 DOSE ADULT IM: CPT

## 2025-05-27 NOTE — PROGRESS NOTES
Prior to immunization administration, verified patients identity using patient s name and date of birth. Please see Immunization Activity for additional information.     Screening Questionnaire for Adult Immunization    Are you sick today?   No   Do you have allergies to medications, food, a vaccine component or latex?   No   Have you ever had a serious reaction after receiving a vaccination?   No   Do you have a long-term health problem with heart, lung, kidney, or metabolic disease (e.g., diabetes), asthma, a blood disorder, no spleen, complement component deficiency, a cochlear implant, or a spinal fluid leak?  Are you on long-term aspirin therapy?   No   Do you have cancer, leukemia, HIV/AIDS, or any other immune system problem?   No   Do you have a parent, brother, or sister with an immune system problem?   No   In the past 3 months, have you taken medications that affect  your immune system, such as prednisone, other steroids, or anticancer drugs; drugs for the treatment of rheumatoid arthritis, Crohn s disease, or psoriasis; or have you had radiation treatments?   No   Have you had a seizure, or a brain or other nervous system problem?   No   During the past year, have you received a transfusion of blood or blood    products, or been given immune (gamma) globulin or antiviral drug?   No   For women: Are you pregnant or is there a chance you could become       pregnant during the next month?   No   Have you received any vaccinations in the past 4 weeks?   No     Immunization questionnaire answers were all negative.    I have reviewed the following standing orders:   This patient is due and qualifies for the Hepatitis B vaccine.    Click here for Hepatitis B Standing Order    I have reviewed the vaccines inclusion and exclusion criteria; No concerns regarding eligibility.     Patient instructed to remain in clinic for 15 minutes afterwards, and to report any adverse reactions.     Screening performed by Farzana VÁSQUEZ  NANETTE Huffman on 5/27/2025 at 10:41 AM.

## 2025-08-05 ENCOUNTER — VIRTUAL VISIT (OUTPATIENT)
Dept: FAMILY MEDICINE | Facility: CLINIC | Age: 25
End: 2025-08-05

## 2025-08-05 DIAGNOSIS — F42.9 OBSESSIVE-COMPULSIVE DISORDER, UNSPECIFIED TYPE: Primary | ICD-10-CM

## 2025-08-05 PROCEDURE — 98006 SYNCH AUDIO-VIDEO EST MOD 30: CPT | Performed by: FAMILY MEDICINE

## 2025-08-05 RX ORDER — BUPROPION HYDROCHLORIDE 150 MG/1
150 TABLET ORAL DAILY
Qty: 30 TABLET | Refills: 0 | Status: SHIPPED | OUTPATIENT
Start: 2025-08-05